# Patient Record
Sex: MALE | Race: WHITE | Employment: FULL TIME | URBAN - METROPOLITAN AREA
[De-identification: names, ages, dates, MRNs, and addresses within clinical notes are randomized per-mention and may not be internally consistent; named-entity substitution may affect disease eponyms.]

---

## 2017-06-14 DIAGNOSIS — E10.8 TYPE 1 DIABETES MELLITUS WITH COMPLICATIONS (H): ICD-10-CM

## 2017-06-28 ENCOUNTER — MEDICAL CORRESPONDENCE (OUTPATIENT)
Dept: HEALTH INFORMATION MANAGEMENT | Facility: CLINIC | Age: 47
End: 2017-06-28

## 2017-11-21 ENCOUNTER — OFFICE VISIT (OUTPATIENT)
Dept: ENDOCRINOLOGY | Facility: CLINIC | Age: 47
End: 2017-11-21

## 2017-11-21 VITALS
HEIGHT: 70 IN | HEART RATE: 82 BPM | DIASTOLIC BLOOD PRESSURE: 82 MMHG | SYSTOLIC BLOOD PRESSURE: 147 MMHG | BODY MASS INDEX: 23.19 KG/M2 | WEIGHT: 162 LBS

## 2017-11-21 DIAGNOSIS — E78.5 DYSLIPIDEMIA: ICD-10-CM

## 2017-11-21 DIAGNOSIS — E06.3 CHRONIC LYMPHOCYTIC THYROIDITIS: ICD-10-CM

## 2017-11-21 DIAGNOSIS — I25.10 CORONARY ARTERY DISEASE DUE TO CALCIFIED CORONARY LESION: ICD-10-CM

## 2017-11-21 DIAGNOSIS — I25.84 CORONARY ARTERY DISEASE DUE TO CALCIFIED CORONARY LESION: ICD-10-CM

## 2017-11-21 DIAGNOSIS — E10.9 TYPE 1 DIABETES MELLITUS WITHOUT COMPLICATION (H): Primary | ICD-10-CM

## 2017-11-21 DIAGNOSIS — E10.9 TYPE 1 DIABETES MELLITUS WITHOUT COMPLICATION (H): ICD-10-CM

## 2017-11-21 LAB
ANION GAP SERPL CALCULATED.3IONS-SCNC: 4 MMOL/L (ref 3–14)
BUN SERPL-MCNC: 13 MG/DL (ref 7–30)
CHLORIDE SERPL-SCNC: 104 MMOL/L (ref 94–109)
CO2 SERPL-SCNC: 32 MMOL/L (ref 20–32)
CREAT SERPL-MCNC: 0.96 MG/DL (ref 0.66–1.25)
CREAT UR-MCNC: 79 MG/DL
GFR SERPL CREATININE-BSD FRML MDRD: 84 ML/MIN/1.7M2
HBA1C MFR BLD: 7.3 % (ref 4.3–6)
MICROALBUMIN UR-MCNC: <5 MG/L
MICROALBUMIN/CREAT UR: NORMAL MG/G CR (ref 0–17)
POTASSIUM SERPL-SCNC: 4.5 MMOL/L (ref 3.4–5.3)
SODIUM SERPL-SCNC: 140 MMOL/L (ref 133–144)
T4 FREE SERPL-MCNC: 1.29 NG/DL (ref 0.76–1.46)
TSH SERPL DL<=0.005 MIU/L-ACNC: 0.59 MU/L (ref 0.4–4)

## 2017-11-21 RX ORDER — ROSUVASTATIN CALCIUM 10 MG/1
10 TABLET, COATED ORAL DAILY
Qty: 90 TABLET | Refills: 3 | Status: SHIPPED | OUTPATIENT
Start: 2017-11-21 | End: 2018-10-16

## 2017-11-21 ASSESSMENT — PAIN SCALES - GENERAL: PAINLEVEL: NO PAIN (0)

## 2017-11-21 NOTE — PROGRESS NOTES
Endocrinology and Diabetes Clinic Follow-up   Date of Service: November 21, 2017    Reason for follow-up: Type I DM    Assessment:    1.  Type 1 diabetes mellitus which remains in good control, A1c 7.3%.   2.  Mild to Moderate Coronary Artery Disease on Crestor 10 mg and baby aspirin daily.  3.  Chronic lymphocytic thyroiditis which has been adequately replaced.   4.  Dyslipidemia which is treated with Crestor 10 mg daily - did not tolerate higher dose.  5.  Adhesive capsulitis of both shoulders - ongoing issue.    Plan:   1) Increase bolus dosing to 1 U : 12 grams carbs with lunch and dinner  2) Continue 670G automode basal  3) Will touch base with CDE about why he may getting kicked out of auto mode so frequently  4) Check TSH, free T4, BMP, urine microalbumin  5) Opthalmology referral  6) Consider referral to lipid clinic to discuss additional CAD primary prevention strategies   7) RTC in 6 months    Patient was seen and examined with attending physician, Dr. Adler.    Subjective:   Schuyler Gallardo is a 47 year old male  with a past medical history of Type I DM and dyslipidemia who presents for follow-up of diabetes. He has had Type I DM for 44 years. He was last seen in clinic on 12/2016 by Dr. Apple. His A1c at that time was 7%. He is now on a Medtronicb 670G with continous glucose monitoring as of July 2017. He really likes it but is bothered that he is frequently kicked out of auto mode during the day. He is not sure why. He is in auto mode 57% of the time. His basal bolus ratio is 47%/53%, or 15 units and 17 units respectively. His data trends indicate that he is pretty stable overnight but that he tends to run a bit high after lunch and dinner. He has been on 1:15 carb ratio for years but is willing to make an adjustment. He checks his blood sugars 3 times daily.    Interval history notable for a calcium score in the top 1%. Had a CT angiogram with mild to moderate coronary disease. He is on Crestor 10 mg  daily and has not been able to tolerate a higher dose. He has considered a fibrate or addition of a PCSK9 inhibitor but currently the indications for the latter drug are quite limited (not for primary prevention in non-familial disease). He has no family history of early coronary disease. He does not smoke and only occasionally drinks alcohol. He is on a baby aspirin.    No previous issues with kidneys, neuropathy, or retinopathy. No skin changes or open sores on his feet. He is overdue for an eye appointment.      Denies symptoms of temperature intolerance, weight changes, diarrhea, constipation, palpitations, excessive sweating, or tremors.      Current Medications:  1. Humalog insulin in a Medtronic 670G insulin pump.  2. Crestor 10 mg daily (muscle fatigue at 20 mg).   3. Levothyroxine 112 mcg daily.   4. Multivitamin daily.   5. Baby aspirin      Current Problem List:   Patient Active Problem List   Diagnosis     Dyslipidemia     Type 1 diabetes mellitus (H)     Medications:   Current Outpatient Prescriptions   Medication Sig Dispense Refill     rosuvastatin (CRESTOR) 10 MG tablet Take 1 tablet (10 mg) by mouth daily 90 tablet 3     VIANNEY CONTOUR NEXT test strip Test 4-5 times daily / OR AS DIRECTED 450 each 3     insulin lispro (HUMALOG VIAL) 100 UNIT/ML VIAL Use in pump basal rate plus meal coverage approx 50-60 units daily 60 mL 3     levothyroxine (LEVOTHROID) 112 MCG tablet Take 1 tablet (112 mcg) by mouth daily 90 tablet 3     Multiple Vitamin (MULTIVITAMINS PO)        Insulin Syringes, Disposable, U-100 0.5 ML MISC        [DISCONTINUED] rosuvastatin (CRESTOR) 20 MG tablet Take 1 tablet (20 mg) by mouth daily 90 tablet 3     Allergies:   No Known Allergies    Social History:  Social History   Substance Use Topics     Smoking status: Never Smoker     Smokeless tobacco: Never Used     Alcohol use Not on file     Bull is  and lives with his wife and 3 children in [place], [place].  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     Family History:  Father had prostate cancer in his 60's     Review of Systems:  CONSTITUTIONAL:  Negative.    HEENT:  He is due for an eye examination and will schedule one.   RESPIRATORY:  Negative.   CARDIOVASCULAR:  Negative.   GASTROINTESTINAL:  Negative.   GENITOURINARY:  Negative.   ENDOCRINE:  See HPI.   INTEGUMENT:  Negative.   MUSCULOSKELETAL:  His shoulders have responded to range of motion exercises but are not yet completely back to normal.   NEUROLOGIC:  Negative.   PSYCHIATRIC:  Negative.     Physical Examination:  Blood pressure 147/82, pulse 82, height 1.778 m (5' 10\"), weight 73.5 kg (162 lb).  Body mass index is 23.24 kg/(m^2).  Wt Readings from Last 4 Encounters:   11/21/17 73.5 kg (162 lb)   08/02/16 80.7 kg (178 lb)   08/20/14 75.8 kg (167 lb)   08/09/13 82.2 kg (181 lb 4.8 oz)     GENERAL:  A healthy-appearing man.   EYES:  Extraocular movements are intact.  Sclerae are clear.  No retinopathy appreciated.   NECK:  No goiter, bruit or adenopathy.   CHEST:  Clear to auscultation.   CARDIOVASCULAR:  Regular rate and rhythm.  No murmur.   ABDOMEN:  Soft. No hepatomegaly. No lipodystrophy noted.  EXTREMITIES: No pretibial edema.  Feet in good repair.  Light touch intact in both feet.     Labs and Studies:   Component      Latest Ref Rng & Units 12/21/2016   Hemoglobin A1C      4.3 - 6.0 % 7.0 (H)   TSH      0.40 - 4.00 mU/L 1.97   T4 Free      0.76 - 1.46 ng/dL 1.31   CK Total      30 - 300 U/L 94   ALT      0 - 70 U/L 21   LDL Cholesterol Direct      <100 mg/dL 90     Stewart Dobbs MD (PGY-4)  Diabetes and Endocrinology Fellow  [facility]  Pager: [phone]     I have seen and examined the patient, reviewed and edited the fellow's note, and agree with the plan of care.  CHARELS Payne    "

## 2017-11-21 NOTE — MR AVS SNAPSHOT
After Visit Summary   11/21/2017    Schuyler Gallardo    MRN: [id number]           Patient Information     Date Of Birth          1970        Visit Information        Provider Department      11/21/2017 2:00 PM Boyd Adler MD [facility] Endocrinology        Today's Diagnoses     Type 1 diabetes mellitus without complication (H)    -  1    Dyslipidemia        Chronic lymphocytic thyroiditis        Coronary artery disease due to calcified coronary lesion          Care Instructions    1. Please have labs completed today.    2. Make those carb ration changes as we discussed - 1 units per 12 grams carbs with lunch and dinner.    3. We will discuss with our diabetes educators about how you've been getting kicked out of auto mode - perhaps there is a solution?    4. Return to clinic in 6 months.    5. We sent in a new prescription for 10 mg Crestor daily    6. Let us know if you would like to consider a fibrate or other treatment modality.                Follow-ups after your visit        Additional Services     OPHTHALMOLOGY ADULT REFERRAL       Your provider has referred you to: [facility]: Eye Clinic [facility] [phone]   [email or web address]    Please be aware that coverage of these services is subject to the terms and limitations of your health insurance plan.  Call member services at your health plan with any benefit or coverage questions.      Please bring the following with you to your appointment:    (1) Any X-Rays, CTs or MRIs which have been performed.  Contact the facility where they were done to arrange for  prior to your scheduled appointment.    (2) List of current medications  (3) This referral request   (4) Any documents/labs given to you for this referral                  Follow-up notes from your care team     Return in about 6 months (around 5/21/2018).      Who to contact     Please call your clinic at [phone] to:    Ask questions about your  "health    Make or cancel appointments    Discuss your medicines    Learn about your test results    Speak to your doctor   If you have compliments or concerns about an experience at your clinic, or if you wish to file a complaint, please contact [facility] Patient Relations at [phone] or email us at [email or web address]         Additional Information About Your Visit        Medicagohart Information     Instamediat is an electronic gateway that provides easy, online access to your medical records. With Experenti, you can request a clinic appointment, read your test results, renew a prescription or communicate with your care team.     To sign up for Experenti visit the website at [email or web address]   You will be asked to enter the access code listed below, as well as some personal information. Please follow the directions to create your username and password.     Your access code is: [email or web address]  Expires: 2018  6:30 AM     Your access code will  in 90 days. If you need help or a new code, please contact your [facility] Clinic or call [phone] for assistance.        Care EveryWhere ID     This is your Care EveryWhere ID. This could be used by other organizations to access your [facility] medical records  [id number]        Your Vitals Were     Pulse Height BMI (Body Mass Index)             82 1.778 m (5' 10\") 23.24 kg/m2          Blood Pressure from Last 3 Encounters:   17 147/82   16 129/80   16 134/82    Weight from Last 3 Encounters:   17 73.5 kg (162 lb)   16 80.7 kg (178 lb)   14 75.8 kg (167 lb)              We Performed the Following     Albumin Random Urine Quantitative with Creat Ratio     Hemoglobin A1c POCT     OPHTHALMOLOGY ADULT REFERRAL          Today's Medication Changes          These changes are accurate as of: 17 11:59 PM.  If you have any questions, ask your nurse or doctor.          "      Start taking these medicines.        Dose/Directions    aspirin 81 MG tablet   Used for:  Type 1 diabetes mellitus without complication (H), Coronary artery disease due to calcified coronary lesion   Started by:  Boyd Adler MD        Dose:  81 mg   Take 1 tablet (81 mg) by mouth daily   Quantity:  30 tablet   Refills:  0         These medicines have changed or have updated prescriptions.        Dose/Directions    rosuvastatin 10 MG tablet   Commonly known as:  CRESTOR   This may have changed:    - medication strength  - how much to take   Used for:  Dyslipidemia   Changed by:  Boyd Adler MD        Dose:  10 mg   Take 1 tablet (10 mg) by mouth daily   Quantity:  90 tablet   Refills:  3            Where to get your medicines      [facility] [place] [place] - [address]   [address] [facility] , [facility] [address]    Hours:  24-hours Phone:  [phone]     rosuvastatin 10 MG tablet         Some of these will need a paper prescription and others can be bought over the counter.  Ask your nurse if you have questions.     You don't need a prescription for these medications     aspirin 81 MG tablet                Primary Care Provider    None Specified       No primary provider on file.        Equal Access to Services     [facility] : Hadfranco Terrazas, kaz lindsay, mamadou thompson, alcira campos . So [facility] [phone].    ATENCIÓN: Si habla español, tiene a dejesus disposición servicios gratuitos de asistencia lingüística. Llame al [phone].    We comply with applicable federal civil rights laws and [place] laws. We do not discriminate on the basis of race, color, national origin, age, disability, sex, sexual orientation, or gender identity.            Thank you!     Thank you for choosing [facility]  for your care. Our goal is always to provide you with excellent care.  Hearing back from our patients is one way we can continue to improve our services. Please take a few minutes to complete the written survey that you may receive in the mail after your visit with us. Thank you!             Your Updated Medication List - Protect others around you: Learn how to safely use, store and throw away your medicines at www.disposemymeds.org.          This list is accurate as of: 11/21/17 11:59 PM.  Always use your most recent med list.                   Brand Name Dispense Instructions for use Diagnosis    aspirin 81 MG tablet     30 tablet    Take 1 tablet (81 mg) by mouth daily    Type 1 diabetes mellitus without complication (H), Coronary artery disease due to calcified coronary lesion       VIANNEY CONTOUR NEXT test strip   Generic drug:  blood glucose monitoring     450 each    Test 4-5 times daily / OR AS DIRECTED    Type 1 diabetes mellitus with complications (H)       insulin lispro 100 UNIT/ML injection    humaLOG    60 mL    Use in pump basal rate plus meal coverage approx 50-60 units daily    Diabetes mellitus type 1 (H)       insulin syringes (disposable) U-100 0.5 ML Misc           levothyroxine 112 MCG tablet    LEVOTHROID    90 tablet    Take 1 tablet (112 mcg) by mouth daily    Type 1 diabetes mellitus (H)       MULTIVITAMINS PO           rosuvastatin 10 MG tablet    CRESTOR    90 tablet    Take 1 tablet (10 mg) by mouth daily    Dyslipidemia

## 2017-11-21 NOTE — PATIENT INSTRUCTIONS
1. Please have labs completed today.    2. Make those carb ration changes as we discussed - 1 units per 12 grams carbs with lunch and dinner.    3. We will discuss with our diabetes educators about how you've been getting kicked out of auto mode - perhaps there is a solution?    4. Return to clinic in 6 months.    5. We sent in a new prescription for 10 mg Crestor daily    6. Let us know if you would like to consider a fibrate or other treatment modality.

## 2017-11-21 NOTE — LETTER
Date:November 28, 2017      Patient was self referred, no letter generated. Do not send.        [facility] Health Information

## 2017-11-21 NOTE — LETTER
11/21/2017       RE: Schuyler Gallardo  [address] [address]     Dear Colleague,    Thank you for referring your patient, Schuyler Gallardo, to the [facility] ENDOCRINOLOGY at [facility]. Please see a copy of my visit note below.    Endocrinology and Diabetes Clinic Follow-up   Date of Service: November 21, 2017    Reason for follow-up: Type I DM    Assessment:    1.  Type 1 diabetes mellitus which remains in good control, A1c 7.3%.   2.  Mild to Moderate Coronary Artery Disease on Crestor 10 mg and baby aspirin daily.  3.  Chronic lymphocytic thyroiditis which has been adequately replaced.   4.  Dyslipidemia which is treated with Crestor 10 mg daily - did not tolerate higher dose.  5.  Adhesive capsulitis of both shoulders - ongoing issue.    Plan:   1) Increase bolus dosing to 1 U : 12 grams carbs with lunch and dinner  2) Continue 670G automode basal  3) Will touch base with CDE about why he may getting kicked out of auto mode so frequently  4) Check TSH, free T4, BMP, urine microalbumin  5) Opthalmology referral  6) Consider referral to lipid clinic to discuss additional CAD primary prevention strategies   7) RTC in 6 months    Patient was seen and examined with attending physician, Dr. Adler.    Subjective:   Schuyler Gallardo is a 47 year old male  with a past medical history of Type I DM and dyslipidemia who presents for follow-up of diabetes. He has had Type I DM for 44 years. He was last seen in clinic on 12/2016 by Dr. Apple. His A1c at that time was 7%. He is now on a Medtronicb 670G with continous glucose monitoring as of July 2017. He really likes it but is bothered that he is frequently kicked out of auto mode during the day. He is not sure why. He is in auto mode 57% of the time. His basal bolus ratio is 47%/53%, or 15 units and 17 units respectively. His data trends indicate that he is pretty stable overnight but that he tends to run a bit high after lunch  and dinner. He has been on 1:15 carb ratio for years but is willing to make an adjustment. He checks his blood sugars 3 times daily.    Interval history notable for a calcium score in the top 1%. Had a CT angiogram with mild to moderate coronary disease. He is on Crestor 10 mg daily and has not been able to tolerate a higher dose. He has considered a fibrate or addition of a PCSK9 inhibitor but currently the indications for the latter drug are quite limited (not for primary prevention in non-familial disease). He has no family history of early coronary disease. He does not smoke and only occasionally drinks alcohol. He is on a baby aspirin.    No previous issues with kidneys, neuropathy, or retinopathy. No skin changes or open sores on his feet. He is overdue for an eye appointment.      Denies symptoms of temperature intolerance, weight changes, diarrhea, constipation, palpitations, excessive sweating, or tremors.      Current Medications:  1. Humalog insulin in a Medtronic 670G insulin pump.  2. Crestor 10 mg daily (muscle fatigue at 20 mg).   3. Levothyroxine 112 mcg daily.   4. Multivitamin daily.   5. Baby aspirin      Current Problem List:   Patient Active Problem List   Diagnosis     Dyslipidemia     Type 1 diabetes mellitus (H)     Medications:   Current Outpatient Prescriptions   Medication Sig Dispense Refill     rosuvastatin (CRESTOR) 10 MG tablet Take 1 tablet (10 mg) by mouth daily 90 tablet 3     VIANNEY CONTOUR NEXT test strip Test 4-5 times daily / OR AS DIRECTED 450 each 3     insulin lispro (HUMALOG VIAL) 100 UNIT/ML VIAL Use in pump basal rate plus meal coverage approx 50-60 units daily 60 mL 3     levothyroxine (LEVOTHROID) 112 MCG tablet Take 1 tablet (112 mcg) by mouth daily 90 tablet 3     Multiple Vitamin (MULTIVITAMINS PO)        Insulin Syringes, Disposable, U-100 0.5 ML MISC        [DISCONTINUED] rosuvastatin (CRESTOR) 20 MG tablet Take 1 tablet (20 mg) by mouth daily 90 tablet 3  "    Allergies:   No Known Allergies    Social History:  Social History   Substance Use Topics     Smoking status: Never Smoker     Smokeless tobacco: Never Used     Alcohol use Not on file     Bull is  and lives with his wife and 3 children in [place], [place].  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     Family History:  Father had prostate cancer in his 60's     Review of Systems:  CONSTITUTIONAL:  Negative.    HEENT:  He is due for an eye examination and will schedule one.   RESPIRATORY:  Negative.   CARDIOVASCULAR:  Negative.   GASTROINTESTINAL:  Negative.   GENITOURINARY:  Negative.   ENDOCRINE:  See HPI.   INTEGUMENT:  Negative.   MUSCULOSKELETAL:  His shoulders have responded to range of motion exercises but are not yet completely back to normal.   NEUROLOGIC:  Negative.   PSYCHIATRIC:  Negative.      Physical Examination:  Blood pressure 147/82, pulse 82, height 1.778 m (5' 10\"), weight 73.5 kg (162 lb).  Body mass index is 23.24 kg/(m^2).  Wt Readings from Last 4 Encounters:   11/21/17 73.5 kg (162 lb)   08/02/16 80.7 kg (178 lb)   08/20/14 75.8 kg (167 lb)   08/09/13 82.2 kg (181 lb 4.8 oz)     GENERAL:  A healthy-appearing man.   EYES:  Extraocular movements are intact.  Sclerae are clear.  No retinopathy appreciated.   NECK:  No goiter, bruit or adenopathy.   CHEST:  Clear to auscultation.   CARDIOVASCULAR:  Regular rate and rhythm.  No murmur.   ABDOMEN:  Soft. No hepatomegaly. No lipodystrophy noted.  EXTREMITIES: No pretibial edema.  Feet in good repair.  Light touch intact in both feet.     Labs and Studies:   Component      Latest Ref Rng & Units 12/21/2016   Hemoglobin A1C      4.3 - 6.0 % 7.0 (H)   TSH      0.40 - 4.00 mU/L 1.97   T4 Free      0.76 - 1.46 ng/dL 1.31   CK Total      30 - 300 U/L 94   ALT      0 - 70 U/L 21   LDL Cholesterol Direct      <100 mg/dL 90     Stewart Dobbs MD (PGY-4)  Diabetes and Endocrinology " Fellow  [facility]  Pager: [phone]     I have seen and examined the patient, reviewed and edited the fellow's note, and agree with the plan of care.  CHARLES Payne      Again, thank you for allowing me to participate in the care of your patient.      Sincerely,    Boyd Adler MD

## 2017-11-21 NOTE — NURSING NOTE
Chief Complaint   Patient presents with     RECHECK     F/U TYPE I DM     Claudia Garcia, CMA  Endocrinology & Diabetes 3G    Capillary Fingerstick performed for an Hemoglobin A1C test

## 2018-05-21 ENCOUNTER — OFFICE VISIT (OUTPATIENT)
Dept: OPHTHALMOLOGY | Facility: CLINIC | Age: 48
End: 2018-05-21
Attending: OPHTHALMOLOGY
Payer: COMMERCIAL

## 2018-05-21 DIAGNOSIS — H52.13 MYOPIC ASTIGMATISM OF BOTH EYES: ICD-10-CM

## 2018-05-21 DIAGNOSIS — H52.203 MYOPIC ASTIGMATISM OF BOTH EYES: ICD-10-CM

## 2018-05-21 DIAGNOSIS — H52.4 PRESBYOPIA: ICD-10-CM

## 2018-05-21 DIAGNOSIS — E10.9 TYPE 1 DIABETES MELLITUS WITHOUT RETINOPATHY (H): Primary | ICD-10-CM

## 2018-05-21 PROCEDURE — G0463 HOSPITAL OUTPT CLINIC VISIT: HCPCS | Mod: ZF

## 2018-05-21 PROCEDURE — 92015 DETERMINE REFRACTIVE STATE: CPT | Mod: ZF

## 2018-05-21 ASSESSMENT — REFRACTION_MANIFEST
OS_ADD: +1.50
OS_AXIS: 180
OD_CYLINDER: +0.75
OD_AXIS: 002
OD_SPHERE: -0.75
OS_CYLINDER: +2.50
OS_SPHERE: -2.00
OD_ADD: +1.50

## 2018-05-21 ASSESSMENT — CUP TO DISC RATIO
OS_RATIO: 0.4
OD_RATIO: 0.4

## 2018-05-21 ASSESSMENT — VISUAL ACUITY
OS_SC+: -1
OS_PH_SC: 20/25-1
OD_SC+: -2
OS_SC: 20/50
OD_SC: 20/15
METHOD: SNELLEN - LINEAR

## 2018-05-21 ASSESSMENT — SLIT LAMP EXAM - LIDS
COMMENTS: NORMAL
COMMENTS: NORMAL

## 2018-05-21 ASSESSMENT — TONOMETRY
OD_IOP_MMHG: 18
OS_IOP_MMHG: 19
IOP_METHOD: TONOPEN

## 2018-05-21 ASSESSMENT — EXTERNAL EXAM - LEFT EYE: OS_EXAM: NORMAL

## 2018-05-21 ASSESSMENT — EXTERNAL EXAM - RIGHT EYE: OD_EXAM: NORMAL

## 2018-05-21 ASSESSMENT — CONF VISUAL FIELD
OD_NORMAL: 1
OS_NORMAL: 1

## 2018-05-21 NOTE — NURSING NOTE
Chief Complaints and History of Present Illnesses   Patient presents with     New Patient     type I diabetic eye exam     HPI    Affected eye(s):  Both   Symptoms:     No decreased vision   No floaters   No flashes   No foreign body sensation   No Dryness      Frequency:  Constant       Do you have eye pain now?:  No      Comments:  Pt here for routine diabetic eye exam  Pt reports he does have glasses for distance vision (didn't bring with today)  Pt reports no recent changes in vision  Last eye exam was 3 or 4 years ago    No drops    Stefanie PEDROZA 8:43 AM May 21, 2018

## 2018-05-21 NOTE — PROGRESS NOTES
HPI  Schuyler Gallardo is a 47 year old male here for diabetic eye exam. He feels his vision is overall good and stable in both eyes. He has glasses which he wears sometimes for driving. No pain, redness, discharge. No flashes/floaters.    POH: No history of eye surgery or trauma. No prior history of DR.  PMH: Type 1 diabetes diagnosed at age 3.    Assessment & Plan      (E10.9) Type 1 diabetes mellitus without retinopathy (H)  (primary encounter diagnosis)  Comment: Last A1c 6.8 per patient. No diabetic retinopathy.  Plan: Discussed the importance of tight blood glucose control in the prevention of diabetic retinopathy. Recommend yearly dilated eye exam.    (H52.203,  H52.13) Myopic astigmatism of both eyes  (H52.4) Presbyopia  Comment: Good vision with refraction  Plan: Given updated glasses Rx.      -----------------------------------------------------------------------------------    Patient disposition:   Return in about 1 year (around 5/21/2019). or sooner as needed.    Teaching statement:  Complete documentation of historical and exam elements from today's encounter can be found in the full encounter summary report (not reduplicated in this progress note). I personally obtained the chief complaint(s) and history of present illness.  I confirmed and edited as necessary the review of systems, past medical/surgical history, family history, social history, and examination findings as documented by others; and I examined the patient myself. I personally reviewed the relevant tests, images, and reports as documented above.     I formulated and edited as necessary the assessment and plan and discussed the findings and management plan with the patient and family.    Stefanie East MD  Comprehensive Ophthalmology & Ocular Pathology  Department of Ophthalmology and Visual Neurosciences  [email or web address]  Pager [phone]

## 2018-05-23 ENCOUNTER — TELEPHONE (OUTPATIENT)
Dept: ENDOCRINOLOGY | Facility: CLINIC | Age: 48
End: 2018-05-23

## 2018-05-23 NOTE — TELEPHONE ENCOUNTER
[facility] Call Center    Phone Message    May a detailed message be left on voicemail: yes    Reason for Call: Other: Patient has a couple questions and would like someone to give him a call back.     Action Taken: Message routed to:  [facility] ([facility] Oscar

## 2018-05-30 ENCOUNTER — TELEPHONE (OUTPATIENT)
Dept: ENDOCRINOLOGY | Facility: CLINIC | Age: 48
End: 2018-05-30

## 2018-08-23 ENCOUNTER — TELEPHONE (OUTPATIENT)
Dept: ENDOCRINOLOGY | Facility: CLINIC | Age: 48
End: 2018-08-23

## 2018-08-23 NOTE — TELEPHONE ENCOUNTER
[facility] Call [facility]    Phone Message    May a detailed message be left on voicemail: yes    Reason for Call: Other: [employer] is calling about a form they faxed over on 8/17/18 for diabetic supplies & pump supplies. Please contact them at [phone], option 2, anyone can help.      Action Taken: Message routed to:  [facility] ([facility]): Endocrinology

## 2018-09-11 ENCOUNTER — MEDICAL CORRESPONDENCE (OUTPATIENT)
Dept: HEALTH INFORMATION MANAGEMENT | Facility: CLINIC | Age: 48
End: 2018-09-11

## 2018-09-20 ENCOUNTER — TELEPHONE (OUTPATIENT)
Dept: ENDOCRINOLOGY | Facility: CLINIC | Age: 48
End: 2018-09-20

## 2018-09-20 NOTE — TELEPHONE ENCOUNTER
[facility] Call Center    Phone Message    May a detailed message be left on voicemail: yes    Reason for Call: Other: Pt wondering if could be fit in the week of 10/15 with Dr. Adler. Pt will be on vacation that week and hoping if could be fit in schedule on 10/16.      Action Taken: Message routed to:  [facility] ([facility]): Endocrinology and Diabetes Clinic

## 2018-10-16 ENCOUNTER — OFFICE VISIT (OUTPATIENT)
Dept: ENDOCRINOLOGY | Facility: CLINIC | Age: 48
End: 2018-10-16
Payer: COMMERCIAL

## 2018-10-16 VITALS
BODY MASS INDEX: 23.82 KG/M2 | WEIGHT: 166.4 LBS | HEIGHT: 70 IN | DIASTOLIC BLOOD PRESSURE: 82 MMHG | HEART RATE: 82 BPM | SYSTOLIC BLOOD PRESSURE: 133 MMHG

## 2018-10-16 DIAGNOSIS — E78.5 DYSLIPIDEMIA: ICD-10-CM

## 2018-10-16 DIAGNOSIS — E55.9 VITAMIN D DEFICIENCY: ICD-10-CM

## 2018-10-16 DIAGNOSIS — E10.65 TYPE 1 DIABETES MELLITUS WITH HYPERGLYCEMIA (H): ICD-10-CM

## 2018-10-16 DIAGNOSIS — E03.9 HYPOTHYROIDISM, UNSPECIFIED TYPE: Primary | ICD-10-CM

## 2018-10-16 RX ORDER — ROSUVASTATIN CALCIUM 10 MG/1
TABLET, COATED ORAL
Qty: 135 TABLET | Refills: 3 | Status: SHIPPED | OUTPATIENT
Start: 2018-10-16 | End: 2019-10-15

## 2018-10-16 RX ORDER — LEVOTHYROXINE SODIUM 112 UG/1
112 TABLET ORAL DAILY
Qty: 90 TABLET | Refills: 3 | Status: SHIPPED | OUTPATIENT
Start: 2018-10-16 | End: 2019-11-21

## 2018-10-16 ASSESSMENT — PAIN SCALES - GENERAL: PAINLEVEL: NO PAIN (0)

## 2018-10-16 NOTE — LETTER
10/16/2018     RE: Schuyler Gallardo  [address] [facility] [address]     Dear Colleague,    Thank you for referring your patient, Schuyler Gallardo, to the [facility] ENDOCRINOLOGY at [facility]. Please see a copy of my visit note below.    [facility]  Endocrinology  Boyd Adler MD  10/16/2018      Chief Complaint:   Diabetes    History of Present Illness:   Schuyler Gallardo is a 48 year old male with a history of type 1 diabetes mellitus and chronic lymphocytic thyroiditis here to follow up. Today he has no complaints, he denies having any acute illness since his last visit.    He use Medtronic 670G insulin pump, which was downloaded and reviewed.  Pump was in auto mode around 53% of the time. He thinks his A1c has been higher since starting 670 G but has helped avoid low's. He thinks that specifically needs more insulin overnight. . He feels that the sensor is accurate on days 1-4, but it starts to get worse and fails by day 5, and he rarely gets to use it on day 6/7. He runs out of sensors Friday of every week due to the sensor failure. Getting replacements takes a 45 minute phone call and is difficult. He usually runs on the treadmill in the evening between 7 and 9pm. He eats dinner before exercise. Sometimes he will have a snack before bed. When he eats before bed he his blood sugar often spikes. When he notices a high he will override it manually because the auto mode takes too long to adjust. This is usually after eating and he miscounts his carbohydrates. He thinks that if he stops using auto mode at bedtime he'll eliminate highs but may have more lows.     Denies numbness and tingling. He reports tolerating exercise well.     He is unable to go above 10mg Crestor because it makes his muscles hurt while running. In the past adding vitamin D was helpful. He would consider alternating between 10 and 20mg doses. He takes a vitamin D supplement.     He denies symptoms of  hypothyroidism including fatigue and weight gain. His sleep is good.     Blood Glucose Monitoring:  We reviewed glucometer and CGM data together.    Average blood glucose of 151 with standard deviation of 52. Highest standard deviation is overnight and the morning. His sensor is worn an average of 4 days per week. 73% in range, 21% between 180 and 250, 4% above 250, and 2% between 50 and 70.     Current Insulin Pump Settings:  Type of Pump: Medtronic Minimed: Model 670G  Current Settings:   BASAL RATES and times:  12   AM (midnight): 0.75 units/hour    1     AM: 0.7 units/hour   6     AM: 0.55 units/hour   10:30  AM: 0.45 units/hour   11:30   PM: 0.65 units/hour    CARB RATIO and times:  12   AM (midnight): 12   Corection Factor (Sensitivity) and times:  12   AM (midnight): 40 mg/dL   BLOOD GLUCOSE TARGET and times:  12   AM (midnight): 90 - 125   Total daily insulin: 32 unit(s), bolus 59% and basal 41%.     Diabetes monitoring and complications:  CAD: Yes: mild to moderate  Last eye exam results: : 05/21/2018   Dental exams: regularly  Microalbuminuria: undetected 11/21/2017  HTN: No  On Statin: Yes  On Aspirin: Yes  Depression: No  Erectile dysfunction: No    Review of Systems:   Pertinent items are noted in HPI.  All other systems are negative.    Active Medications:     Current Outpatient Prescriptions:      blood glucose monitoring ([employer] CONTOUR NEXT) test strip, Test 4-5 times daily / OR AS DIRECTED, Disp: 450 each, Rfl: 3     insulin lispro (HUMALOG) 100 UNIT/ML injection, Use in pump basal rate plus meal coverage approx 50-60 units daily, Disp: 60 mL, Rfl: 3     levothyroxine (LEVOTHROID) 112 MCG tablet, Take 1 tablet (112 mcg) by mouth daily, Disp: 90 tablet, Rfl: 3     rosuvastatin (CRESTOR) 10 MG tablet, TAKE 10 MG ALTERNATING WITH 20 MG DAILY., Disp: 135 tablet, Rfl: 3     aspirin 81 MG tablet, Take 1 tablet (81 mg) by mouth daily, Disp: 30 tablet, Rfl:      Insulin Syringes, Disposable, U-100 0.5 ML  "MISC, , Disp: , Rfl:      Multiple Vitamin (MULTIVITAMINS PO), , Disp: , Rfl:      [DISCONTINUED] insulin lispro (HUMALOG VIAL) 100 UNIT/ML VIAL, Use in pump basal rate plus meal coverage approx 50-60 units daily, Disp: 60 mL, Rfl: 3     [DISCONTINUED] levothyroxine (LEVOTHROID) 112 MCG tablet, Take 1 tablet (112 mcg) by mouth daily, Disp: 90 tablet, Rfl: 3     [DISCONTINUED] rosuvastatin (CRESTOR) 10 MG tablet, Take 1 tablet (10 mg) by mouth daily, Disp: 90 tablet, Rfl: 3      Allergies:   Review of patient's allergies indicates no known allergies.      Past Medical History:  Type 1 diabetes mellitus  Dyslipidemia   Lymphocytic thyroiditis  Coronary artery disease     Past Surgical History:  No past surgical history on file.    Family History:   Paternal grandfather: macular degeneration      Social History:      Non smoker  Cardiologist, doing a talk on atrial fibrillation     Physical Exam:   /82  Pulse 82  Ht 1.778 m (5' 10\")  Wt 75.5 kg (166 lb 6.4 oz)  BMI 23.88 kg/m2     Wt Readings from Last 10 Encounters:   10/16/18 75.5 kg (166 lb 6.4 oz)   11/21/17 73.5 kg (162 lb)   08/02/16 80.7 kg (178 lb)   08/20/14 75.8 kg (167 lb)   08/09/13 82.2 kg (181 lb 4.8 oz)   07/31/12 79 kg (174 lb 3.2 oz)   10/19/11 76.2 kg (168 lb)      Constitutional: no distress, comfortable, pleasant   Eyes: anicteric, normal extra-ocular movements, No lig lag, retraction or proptosis  Ears, Nose and Throat: throat clear  Neck: supple, no thyromegaly.   Cardiovascular: regular rate and rhythm, normal S1 and S2, no murmurs  Respiratory: clear to auscultation, no wheezes or crackles, normal breath sounds   Gastrointestinal: nontender, no striae   Musculoskeletal: no edema   Skin:  no jaundice   Neurological: cranial nerves intact, normal strength, reflexes at patella and biceps normal, no tremor   Psychological: appropriate mood   Lymphatic: no cervical lymphadenopathy      Data:  Lab Results   Component Value Date    NA " 140 11/21/2017    POTASSIUM 4.5 11/21/2017    CHLORIDE 104 11/21/2017    CO2 32 11/21/2017    ANIONGAP 4 11/21/2017     (H) 08/01/2016    BUN 13 11/21/2017    CR 0.96 11/21/2017     Lab Results   Component Value Date    GFRESTIMATED 84 11/21/2017    GFRESTIMATED 78 08/01/2016    GFRESTIMATED 81 08/20/2014    GFRESTBLACK >90 11/21/2017    GFRESTBLACK >90   GFR Calc   08/01/2016    GFRESTBLACK >90   GFR Calc   08/20/2014      Lab Results   Component Value Date    MICROL <5 11/21/2017    UMALCR Unable to calculate due to low value 11/21/2017        Lab Results   Component Value Date    A1C 7.0 (H) 12/21/2016    A1C 6.6 (H) 08/01/2016    A1C 6.7 (H) 08/20/2014    A1C 6.5 (H) 08/08/2013    A1C 6.5 (H) 10/19/2011    HEMOGLOBINA1 7.3 (A) 11/21/2017    HEMOGLOBINA1 6.7 (H) 10/21/2009    HEMOGLOBINA1 6.6 (H) 09/18/2008     Lab Results   Component Value Date    CHOL 151 08/01/2016    CHOL 133 08/20/2014    TRIG 50 08/01/2016    TRIG 52 08/20/2014    HDL 54 08/01/2016    HDL 57 08/20/2014    LDL 90 12/21/2016    LDL 87 08/01/2016    NHDL 98 08/01/2016       Assessment and Plan:    Type 1 diabetes mellitus with hyperglycemia (H)  Well controlled type 1 diabetes mellitus with an A1c of 6.7%. He prefers even tighter control.   He may try an extended manual bolus at night with snacks to reduce overnight highs.    - blood glucose monitoring (VIANNEY CONTOUR NEXT) test strip  Dispense: 450 each; Refill: 3  - insulin lispro (HUMALOG) 100 UNIT/ML injection  Dispense: 60 mL; Refill: 3  - Albumin Random Urine Quantitative with Creat Ratio  - Basic metabolic panel    Dyslipidemia  Patient would like to try improving lipids with alternating days of 10 and 20mg doses of crestor.   - rosuvastatin (CRESTOR) 10 MG tablet  Dispense: 135 tablet; Refill: 3  - Lipid panel reflex to direct LDL Non-fasting    Hypothyroidism, unspecified type  No complaints of symptoms of hypothyroidism. Will recheck labs and  refill his prescription.   - levothyroxine (LEVOTHROID) 112 MCG tablet  Dispense: 90 tablet; Refill: 3  - TSH with free T4 reflex    Vitamin D deficiency  Patient takes a vitamin D supplement and would like to know if he is within proper range.   - Vitamin D Deficiency      Follow-up: Return in about 6 months (around 4/16/2019).     >50% of 25 minute visit spent in face to face counseling, education and coordination of care related to options for better glycemic control as well as preventing, detecting, and treating hypoglycemia.       Scribe Disclosure:   I, Xu Castano, am serving as a scribe to document services personally performed by Boyd Adler MD at this visit, based upon the provider's statements to me. All documentation has been reviewed by the aforementioned provider prior to being entered into the official medical record.     Portions of this medical record were completed by a scribe. UPON MY REVIEW AND AUTHENTICATION BY ELECTRONIC SIGNATURE, this confirms (a) I performed the applicable clinical services, and (b) the record is accurate.      CHARLES Jaimes

## 2018-10-16 NOTE — MR AVS SNAPSHOT
After Visit Summary   10/16/2018    Schuyler Gallardo    MRN: [id number]           Patient Information     Date Of Birth          1970        Visit Information        Provider Department      10/16/2018 2:00 PM Boyd Adler MD [facility] Endocrinology        Today's Diagnoses     Hypothyroidism, unspecified type    -  1    Dyslipidemia        Type 1 diabetes mellitus with hyperglycemia (H)        Vitamin D deficiency           Follow-ups after your visit        Follow-up notes from your care team     Return in about 6 months (around 4/16/2019).      Your next 10 appointments already scheduled     Oct 16, 2018  6:45 PM CDT   LAB with [facility] ([facility])    [address] [place] [address] Floor  [facility] [address] [phone]           Please do not eat 10-12 hours before your appointment if you are coming in fasting for labs on lipids, cholesterol, or glucose (sugar). This does not apply to pregnant women. Water, hot tea and black coffee (with nothing added) are okay. Do not drink other fluids, diet soda or chew gum.              Future tests that were ordered for you today     Open Future Orders        Priority Expected Expires Ordered    Albumin Random Urine Quantitative with Creat Ratio Routine 10/16/2018 10/16/2019 10/16/2018    Basic metabolic panel Routine 10/16/2018 10/16/2019 10/16/2018    TSH with free T4 reflex Routine 10/16/2018 10/16/2019 10/16/2018    Vitamin D Deficiency Routine 4/14/2019 10/16/2019 10/16/2018    Lipid panel reflex to direct LDL Non-fasting Routine 10/16/2019 10/16/2019 10/16/2018            Who to contact     Please call your clinic at [phone] to:    Ask questions about your health    Make or cancel appointments    Discuss your medicines    Learn about your test results    Speak to your doctor            Additional Information About Your Visit        MyChart Information     [employer] is an electronic gateway that  "provides easy, online access to your medical records. With [employer], you can request a clinic appointment, read your test results, renew a prescription or communicate with your care team.     To sign up for [employer] visit the website at [email or web address]   You will be asked to enter the access code listed below, as well as some personal information. Please follow the directions to create your username and password.     Your access code is: [email or web address]  Expires: 2018  6:31 AM     Your access code will  in 90 days. If you need help or a new code, please contact your [facility] or call [phone] for assistance.        Care EveryWhere ID     This is your Care EveryWhere ID. This could be used by other organizations to access your [place] medical records  [id number]        Your Vitals Were     Pulse Height BMI (Body Mass Index)             82 1.778 m (5' 10\") 23.88 kg/m2          Blood Pressure from Last 3 Encounters:   10/16/18 133/82   17 147/82   16 129/80    Weight from Last 3 Encounters:   10/16/18 75.5 kg (166 lb 6.4 oz)   17 73.5 kg (162 lb)   16 80.7 kg (178 lb)                 Today's Medication Changes          These changes are accurate as of 10/16/18  4:35 PM.  If you have any questions, ask your nurse or doctor.               These medicines have changed or have updated prescriptions.        Dose/Directions    rosuvastatin 10 MG tablet   Commonly known as:  CRESTOR   This may have changed:    - how much to take  - how to take this  - when to take this  - additional instructions   Used for:  Dyslipidemia, Type 1 diabetes mellitus with hyperglycemia (H), Hypothyroidism, unspecified type, Vitamin D deficiency   Changed by:  Boyd Adler MD        TAKE 10 MG ALTERNATING WITH 20 MG DAILY.   Quantity:  135 tablet   Refills:  3            Where to get your medicines      These medications were sent to [facility] - [place], [place] - [address]  [address] [place] , [facility] [address]    Hours:  24-hours Phone:  [phone]     blood glucose monitoring test strip    insulin lispro 100 UNIT/ML injection    levothyroxine 112 MCG tablet    rosuvastatin 10 MG tablet                Primary Care Provider Fax #    Physician No Ref-Primary [phone]       No address on file        Equal Access to Services     DONNELL KIMBLE : Hadii aad ku hadasho Soomaali, waaxda luqadaha, qaybta kaalmada adeegyada, waxay idiin haynainn adeeg leo lalaurennaila . So [facility] [phone].    ATENCIÓN: Si habla español, tiene a dejesus disposición servicios gratuitos de asistencia lingüística. Llame al [phone].    We comply with applicable federal civil rights laws and [place] laws. We do not discriminate on the basis of race, color, national origin, age, disability, sex, sexual orientation, or gender identity.            Thank you!     Thank you for choosing [facility] ENDOCRINOLOGY  for your care. Our goal is always to provide you with excellent care. Hearing back from our patients is one way we can continue to improve our services. Please take a few minutes to complete the written survey that you may receive in the mail after your visit with us. Thank you!             Your Updated Medication List - Protect others around you: Learn how to safely use, store and throw away your medicines at www.disposemymeds.org.          This list is accurate as of 10/16/18  4:35 PM.  Always use your most recent med list.                   Brand Name Dispense Instructions for use Diagnosis    aspirin 81 MG tablet     30 tablet    Take 1 tablet (81 mg) by mouth daily    Type 1 diabetes mellitus without complication (H), Coronary artery disease due to calcified coronary lesion       blood glucose monitoring test strip    VIANNEY CONTOUR NEXT    450 each    Test 4-5 times daily / OR AS DIRECTED    Dyslipidemia, Type 1 diabetes mellitus with  hyperglycemia (H), Hypothyroidism, unspecified type, Vitamin D deficiency       insulin lispro 100 UNIT/ML injection    humaLOG    60 mL    Use in pump basal rate plus meal coverage approx 50-60 units daily    Type 1 diabetes mellitus with hyperglycemia (H), Dyslipidemia, Hypothyroidism, unspecified type, Vitamin D deficiency       insulin syringes (disposable) U-100 0.5 ML Misc           levothyroxine 112 MCG tablet    LEVOTHROID    90 tablet    Take 1 tablet (112 mcg) by mouth daily    Hypothyroidism, unspecified type, Dyslipidemia, Type 1 diabetes mellitus with hyperglycemia (H), Vitamin D deficiency       MULTIVITAMINS PO           rosuvastatin 10 MG tablet    CRESTOR    135 tablet    TAKE 10 MG ALTERNATING WITH 20 MG DAILY.    Dyslipidemia, Type 1 diabetes mellitus with hyperglycemia (H), Hypothyroidism, unspecified type, Vitamin D deficiency

## 2018-10-16 NOTE — PROGRESS NOTES
[facility]  Endocrinology  Boyd Adler MD  10/16/2018      Chief Complaint:   Diabetes    History of Present Illness:   Schuyler Gallardo is a 48 year old male with a history of type 1 diabetes mellitus and chronic lymphocytic thyroiditis here to follow up. Today he has no complaints, he denies having any acute illness since his last visit.    He use Medtronic 670G insulin pump, which was downloaded and reviewed.  Pump was in auto mode around 53% of the time. He thinks his A1c has been higher since starting 670 G but has helped avoid low's. He thinks that specifically needs more insulin overnight. . He feels that the sensor is accurate on days 1-4, but it starts to get worse and fails by day 5, and he rarely gets to use it on day 6/7. He runs out of sensors Friday of every week due to the sensor failure. Getting replacements takes a 45 minute phone call and is difficult. He usually runs on the treadmill in the evening between 7 and 9pm. He eats dinner before exercise. Sometimes he will have a snack before bed. When he eats before bed he his blood sugar often spikes. When he notices a high he will override it manually because the auto mode takes too long to adjust. This is usually after eating and he miscounts his carbohydrates. He thinks that if he stops using auto mode at bedtime he'll eliminate highs but may have more lows.     Denies numbness and tingling. He reports tolerating exercise well.     He is unable to go above 10mg Crestor because it makes his muscles hurt while running. In the past adding vitamin D was helpful. He would consider alternating between 10 and 20mg doses. He takes a vitamin D supplement.     He denies symptoms of hypothyroidism including fatigue and weight gain. His sleep is good.     Blood Glucose Monitoring:  We reviewed glucometer and CGM data together.    Average blood glucose of 151 with standard deviation of 52. Highest standard deviation is overnight and the morning. His sensor is  worn an average of 4 days per week. 73% in range, 21% between 180 and 250, 4% above 250, and 2% between 50 and 70.     Current Insulin Pump Settings:  Type of Pump: TixAlert Minimed: Model 670G  Current Settings:   BASAL RATES and times:  12   AM (midnight): 0.75 units/hour    1     AM: 0.7 units/hour   6     AM: 0.55 units/hour   10:30  AM: 0.45 units/hour   11:30   PM: 0.65 units/hour    CARB RATIO and times:  12   AM (midnight): 12   Corection Factor (Sensitivity) and times:  12   AM (midnight): 40 mg/dL   BLOOD GLUCOSE TARGET and times:  12   AM (midnight): 90 - 125   Total daily insulin: 32 unit(s), bolus 59% and basal 41%.     Diabetes monitoring and complications:  CAD: Yes: mild to moderate  Last eye exam results: : 05/21/2018   Dental exams: regularly  Microalbuminuria: undetected 11/21/2017  HTN: No  On Statin: Yes  On Aspirin: Yes  Depression: No  Erectile dysfunction: No    Review of Systems:   Pertinent items are noted in HPI.  All other systems are negative.    Active Medications:     Current Outpatient Prescriptions:      blood glucose monitoring ([employer] CONTOUR NEXT) test strip, Test 4-5 times daily / OR AS DIRECTED, Disp: 450 each, Rfl: 3     insulin lispro (HUMALOG) 100 UNIT/ML injection, Use in pump basal rate plus meal coverage approx 50-60 units daily, Disp: 60 mL, Rfl: 3     levothyroxine (LEVOTHROID) 112 MCG tablet, Take 1 tablet (112 mcg) by mouth daily, Disp: 90 tablet, Rfl: 3     rosuvastatin (CRESTOR) 10 MG tablet, TAKE 10 MG ALTERNATING WITH 20 MG DAILY., Disp: 135 tablet, Rfl: 3     aspirin 81 MG tablet, Take 1 tablet (81 mg) by mouth daily, Disp: 30 tablet, Rfl:      Insulin Syringes, Disposable, U-100 0.5 ML MISC, , Disp: , Rfl:      Multiple Vitamin (MULTIVITAMINS PO), , Disp: , Rfl:      [DISCONTINUED] insulin lispro (HUMALOG VIAL) 100 UNIT/ML VIAL, Use in pump basal rate plus meal coverage approx 50-60 units daily, Disp: 60 mL, Rfl: 3     [DISCONTINUED] levothyroxine (LEVOTHROID)  "112 MCG tablet, Take 1 tablet (112 mcg) by mouth daily, Disp: 90 tablet, Rfl: 3     [DISCONTINUED] rosuvastatin (CRESTOR) 10 MG tablet, Take 1 tablet (10 mg) by mouth daily, Disp: 90 tablet, Rfl: 3      Allergies:   Review of patient's allergies indicates no known allergies.      Past Medical History:  Type 1 diabetes mellitus  Dyslipidemia   Lymphocytic thyroiditis  Coronary artery disease     Past Surgical History:  No past surgical history on file.    Family History:   Paternal grandfather: macular degeneration      Social History:      Non smoker  Cardiologist, doing a talk on atrial fibrillation     Physical Exam:   /82  Pulse 82  Ht 1.778 m (5' 10\")  Wt 75.5 kg (166 lb 6.4 oz)  BMI 23.88 kg/m2     Wt Readings from Last 10 Encounters:   10/16/18 75.5 kg (166 lb 6.4 oz)   11/21/17 73.5 kg (162 lb)   08/02/16 80.7 kg (178 lb)   08/20/14 75.8 kg (167 lb)   08/09/13 82.2 kg (181 lb 4.8 oz)   07/31/12 79 kg (174 lb 3.2 oz)   10/19/11 76.2 kg (168 lb)      Constitutional: no distress, comfortable, pleasant   Eyes: anicteric, normal extra-ocular movements, No lig lag, retraction or proptosis  Ears, Nose and Throat: throat clear  Neck: supple, no thyromegaly.   Cardiovascular: regular rate and rhythm, normal S1 and S2, no murmurs  Respiratory: clear to auscultation, no wheezes or crackles, normal breath sounds   Gastrointestinal: nontender, no striae   Musculoskeletal: no edema   Skin:  no jaundice   Neurological: cranial nerves intact, normal strength, reflexes at patella and biceps normal, no tremor   Psychological: appropriate mood   Lymphatic: no cervical lymphadenopathy      Data:  Lab Results   Component Value Date     11/21/2017    POTASSIUM 4.5 11/21/2017    CHLORIDE 104 11/21/2017    CO2 32 11/21/2017    ANIONGAP 4 11/21/2017     (H) 08/01/2016    BUN 13 11/21/2017    CR 0.96 11/21/2017     Lab Results   Component Value Date    GFRESTIMATED 84 11/21/2017    GFRESTIMATED 78 " 08/01/2016    GFRESTIMATED 81 08/20/2014    GFRESTBLACK >90 11/21/2017    GFRESTBLACK >90   GFR Calc   08/01/2016    GFRESTBLACK >90   GFR Calc   08/20/2014      Lab Results   Component Value Date    MICROL <5 11/21/2017    UMALCR Unable to calculate due to low value 11/21/2017        Lab Results   Component Value Date    A1C 7.0 (H) 12/21/2016    A1C 6.6 (H) 08/01/2016    A1C 6.7 (H) 08/20/2014    A1C 6.5 (H) 08/08/2013    A1C 6.5 (H) 10/19/2011    HEMOGLOBINA1 7.3 (A) 11/21/2017    HEMOGLOBINA1 6.7 (H) 10/21/2009    HEMOGLOBINA1 6.6 (H) 09/18/2008     Lab Results   Component Value Date    CHOL 151 08/01/2016    CHOL 133 08/20/2014    TRIG 50 08/01/2016    TRIG 52 08/20/2014    HDL 54 08/01/2016    HDL 57 08/20/2014    LDL 90 12/21/2016    LDL 87 08/01/2016    NHDL 98 08/01/2016       Assessment and Plan:    Type 1 diabetes mellitus with hyperglycemia (H)  Well controlled type 1 diabetes mellitus with an A1c of 6.7%. He prefers even tighter control.   He may try an extended manual bolus at night with snacks to reduce overnight highs.    - blood glucose monitoring ([employer] CONTOUR NEXT) test strip  Dispense: 450 each; Refill: 3  - insulin lispro (HUMALOG) 100 UNIT/ML injection  Dispense: 60 mL; Refill: 3  - Albumin Random Urine Quantitative with Creat Ratio  - Basic metabolic panel    Dyslipidemia  Patient would like to try improving lipids with alternating days of 10 and 20mg doses of crestor.   - rosuvastatin (CRESTOR) 10 MG tablet  Dispense: 135 tablet; Refill: 3  - Lipid panel reflex to direct LDL Non-fasting    Hypothyroidism, unspecified type  No complaints of symptoms of hypothyroidism. Will recheck labs and refill his prescription.   - levothyroxine (LEVOTHROID) 112 MCG tablet  Dispense: 90 tablet; Refill: 3  - TSH with free T4 reflex    Vitamin D deficiency  Patient takes a vitamin D supplement and would like to know if he is within proper range.   - Vitamin D Deficiency       Follow-up: Return in about 6 months (around 4/16/2019).     >50% of 25 minute visit spent in face to face counseling, education and coordination of care related to options for better glycemic control as well as preventing, detecting, and treating hypoglycemia.       Scribe Disclosure:   I, Xu Castano, am serving as a scribe to document services personally performed by Boyd Adler MD at this visit, based upon the provider's statements to me. All documentation has been reviewed by the aforementioned provider prior to being entered into the official medical record.     Portions of this medical record were completed by a scribe. UPON MY REVIEW AND AUTHENTICATION BY ELECTRONIC SIGNATURE, this confirms (a) I performed the applicable clinical services, and (b) the record is accurate.      CHARLES Jaimes

## 2018-10-24 ENCOUNTER — TELEPHONE (OUTPATIENT)
Dept: EDUCATION SERVICES | Facility: CLINIC | Age: 48
End: 2018-10-24

## 2018-10-24 DIAGNOSIS — E10.65 TYPE 1 DIABETES MELLITUS WITH HYPERGLYCEMIA (H): Primary | ICD-10-CM

## 2019-06-18 ENCOUNTER — TELEPHONE (OUTPATIENT)
Dept: ENDOCRINOLOGY | Facility: CLINIC | Age: 49
End: 2019-06-18

## 2019-06-18 NOTE — TELEPHONE ENCOUNTER
[facility] Call Center    Phone Message    May a detailed message be left on voicemail: yes    Reason for Call: Order(s): Glucose sensor transmitter  Reason for requested: Current one is damaged will need a new order sent  Date needed: asap  Provider name: Dr. Adler to [place] (fax number not available)      Action Taken: Message routed to:  [facility] ([facility]): santiago husain

## 2019-06-19 ENCOUNTER — MEDICAL CORRESPONDENCE (OUTPATIENT)
Dept: HEALTH INFORMATION MANAGEMENT | Facility: CLINIC | Age: 49
End: 2019-06-19

## 2019-06-19 NOTE — TELEPHONE ENCOUNTER
Forms received from: [employer]     Forms were CMN for oow transmitter     Faxed Date:    In Dr. Burdick box for signature 6/19/19

## 2019-06-20 ENCOUNTER — TELEPHONE (OUTPATIENT)
Dept: EDUCATION SERVICES | Facility: CLINIC | Age: 49
End: 2019-06-20

## 2019-06-20 NOTE — TELEPHONE ENCOUNTER
Veronique Healy MD signed off on CMN due to time line.  Patient needs transmitter by Friday.  The completed form was e-mailed to Michelle Campa 6/19/2019. Kenzie Hughes RN,CDE

## 2019-06-25 NOTE — TELEPHONE ENCOUNTER
E-mail from Ana Campa at [employer]:    Hello,    I am working on a replacement transmitter order for Schuyler Gallardo.  This is an urgent request as Schuyler is going out of town this Friday and is hoping to have it delivered before then.  Thank you so much for your help, we will need the attached CMN filled out and signed.  Thanks!         Thank you,    Ana Campa    [facility]   [place]    Phone: [phone] ext. [phone]  Fax: [phone]    [email or web address]    [email or web address]     Alleviate pain, restore health, and extend    Reply:    Here is the LOMN.  I had Dr. Healy sign it as Dr. Burdick is out of the office.

## 2019-09-16 ENCOUNTER — DOCUMENTATION ONLY (OUTPATIENT)
Dept: CARE COORDINATION | Facility: CLINIC | Age: 49
End: 2019-09-16

## 2019-10-14 NOTE — PROGRESS NOTES
[facility]  Endocrinology  Amicitlaly Adler MD  10/15/2019      Chief Complaint:   Diabetes    History of Present Illness:   Schuyler Gallardo is a 49 year old male with a history of type 1 diabetes mellitus and chronic lymphocytic thyroiditis here to follow up.    He does have a CGM but does not always wear it. CGM can be inconvenient with alarms going off while he is performing procedures. He is using his Medtronic 670G, mostly in manual mode. He feels his insulin to carb ratio for meals is fine. Reports  he tends to run high overnight and wakes with fasting hyperglycemia. Auto mode had not been doing a good job of correcting his blood glucose overnight. His solution was to stop eating snacks after 8pm or try a low carb snack.. He had a morning high of 400 followed by a low of 40 after taking a correction. He exercises in the evenings, right now, he does 1 hour of tennis 2x per week and running on a treadmill 2x per week. Denies early satiety and nausea.    His blood pressure was high during rooming but he was rushing into his appointment. He plans to do some at home readings.     He is taking Crestor but has not tolerated higher doses of statin. He has been taking 10mg. On weeks he skips he feels better. He reports muscle soreness with rigrous exercise while on statins. . He has tried other statins and have been more poorly tolerated. He reports having a high coronary calcium score and coronary artery disease without obstruction on screening cardiac testing. He is open to starting Repatha as an alternative because he thinks it may be possible he tolerates it better.  Denies symptoms of claudication.   Over all feels well.     Blood Glucose Monitoring:    A1c was 6.4% today. He does not consistently wear a sensor. Per glucometer he has occasional morning highs.    Current Insulin Pump Settings:  Type of Pump: Medtronic Minimed: Model 670G  Current Settings:   BASAL RATES and times:  12   AM (midnight): 0.75 units/hour     1     AM: 0.7 units/hour   6     AM: 0.55 units/hour   10:30  AM: 0.45 units/hour   11:30   PM: 0.65 units/hour    CARB RATIO and times:  12   AM (midnight): 12   Corection Factor (Sensitivity) and times:  12   AM (midnight): 40 mg/dL   BLOOD GLUCOSE TARGET and times:  12   AM (midnight): 90 - 125       Amount of Time Insulin is Active:  3 hrs    Diabetes monitoring and complications:  CAD: Yes  Last eye exam results: 5/21/2019. No DR  Microalbuminuria: negative 2017   Neuropathy: No  HTN: No  On Statin: Yes  On Aspirin: Yes  Depression: Yes  Erectile dysfunction: No    Patient Supplied Answers To Diabetic Questionnaire  No flowsheet data found.     Review of Systems:   Pertinent items are noted in HPI.  All other systems are negative.    Active Medications:     Current Outpatient Medications:      aspirin 81 MG tablet, Take 1 tablet (81 mg) by mouth daily, Disp: 30 tablet, Rfl:      blood glucose monitoring ([employer] CONTOUR NEXT) test strip, Test 4-5 times daily / OR AS DIRECTED, Disp: 450 each, Rfl: 3     Continuous Blood Gluc Sensor (MINIMED GUARDIAN SENSOR 3) MISC, 1 each once a week Change every 5 days, Disp: 10 each, Rfl: 11     insulin lispro (HUMALOG) 100 UNIT/ML injection, Use in pump basal rate plus meal coverage approx 50-60 units daily, Disp: 60 mL, Rfl: 3     Insulin Syringes, Disposable, U-100 0.5 ML MISC, , Disp: , Rfl:      levothyroxine (LEVOTHROID) 112 MCG tablet, Take 1 tablet (112 mcg) by mouth daily, Disp: 90 tablet, Rfl: 3     Multiple Vitamin (MULTIVITAMINS PO), , Disp: , Rfl:      rosuvastatin (CRESTOR) 10 MG tablet, Take 1 tablet (10 mg) by mouth daily, Disp: 90 tablet, Rfl: 3      Allergies:   Patient has no known allergies.      Past Medical History:  Type 1 diabetes mellitus  Dyslipidemia  Chronic lymphocytic thyroiditis  Coronary artery disease    Family History:   Paternal grandmother: macular degeneration      Social History:     Non-smoker     Physical Exam:   BP (!) 142/76    "Pulse 72   Ht 1.778 m (5' 10\")   Wt 75.7 kg (166 lb 12.8 oz)   BMI 23.93 kg/m       Wt Readings from Last 10 Encounters:   10/15/19 75.7 kg (166 lb 12.8 oz)   10/16/18 75.5 kg (166 lb 6.4 oz)   11/21/17 73.5 kg (162 lb)   08/02/16 80.7 kg (178 lb)   08/20/14 75.8 kg (167 lb)   08/09/13 82.2 kg (181 lb 4.8 oz)   07/31/12 79 kg (174 lb 3.2 oz)   10/19/11 76.2 kg (168 lb)        Wt Readings from Last 1 Encounters:   10/15/19 75.7 kg (166 lb 12.8 oz)     Constitutional: no distress, comfortable, pleasant   Eyes: anicteric, , normal extra-ocular movements   Neck:  no thyromegaly.   Cardiovascular: regular rate and rhythm, normal S1 and S2, no murmurs  Respiratory: clear to auscultation, no wheezes or crackles, normal breath sounds   Musculoskeletal:  no edema   Skin: no jaundice  Neurological:  A and O x 3, good historian.  Psychological: appropriate mood, good eye contact, normal affect     Data:  Lab Results   Component Value Date     11/21/2017    POTASSIUM 4.5 11/21/2017    CHLORIDE 104 11/21/2017    CO2 32 11/21/2017    ANIONGAP 4 11/21/2017     (H) 08/01/2016    BUN 13 11/21/2017    CR 0.96 11/21/2017     Lab Results   Component Value Date    GFRESTIMATED 84 11/21/2017    GFRESTIMATED 78 08/01/2016    GFRESTIMATED 81 08/20/2014    GFRESTBLACK >90 11/21/2017    GFRESTBLACK >90   GFR Calc   08/01/2016    GFRESTBLACK >90   GFR Calc   08/20/2014      Lab Results   Component Value Date    MICROL <5 11/21/2017    UMALCR Unable to calculate due to low value 11/21/2017        Lab Results   Component Value Date    A1C 7.0 (H) 12/21/2016    A1C 6.6 (H) 08/01/2016    A1C 6.7 (H) 08/20/2014    A1C 6.5 (H) 08/08/2013    A1C 6.5 (H) 10/19/2011    HEMOGLOBINA1 6.4 (A) 10/15/2019    HEMOGLOBINA1 7.3 (A) 11/21/2017    HEMOGLOBINA1 6.7 (H) 10/21/2009    HEMOGLOBINA1 6.6 (H) 09/18/2008     No results found for: CPEPT, GADAB, ISCAB    Lab Results   Component Value Date    CHOL 151 " 08/01/2016    CHOL 133 08/20/2014    TRIG 50 08/01/2016    TRIG 52 08/20/2014    HDL 54 08/01/2016    HDL 57 08/20/2014    LDL 90 12/21/2016    LDL 87 08/01/2016    NHDL 98 08/01/2016       Assessment and Plan:  Type 1 diabetes mellitus (H)  Well controlled with his current pump settings. A1c today was 6.4%.   Due for ruotine labs  - Hemoglobin A1c POCT    Dyslipidemia  LDL not in range. He does not tolerate higher doses of statin. Non obstructive disease on cardiac CT.  Will refer to Dr. Wick in cardiology to discuss his options for treatment, specifically use of PCSK9 inhibitor. Adding Zetia could be another option.   - rosuvastatin (CRESTOR) 10 MG tablet  Dispense: 90 tablet; Refill: 3  - CARDIOLOGY EVAL ADULT REFERRAL    Hypothyroidism    check labs     Follow-up: Return in about 1 year (around 10/15/2020).       Scribe Disclosure:  I, Xu Castano, am serving as a scribe to document services personally performed by Boyd Adler MD at this visit, based upon the provider's statements to me. All documentation has been reviewed by the aforementioned provider prior to being entered into the official medical record.     Portions of this medical record were completed by a scribe. UPON MY REVIEW AND AUTHENTICATION BY ELECTRONIC SIGNATURE, this confirms (a) I performed the applicable clinical services, and (b) the record is accurate.      CHARLES Payne

## 2019-10-15 ENCOUNTER — OFFICE VISIT (OUTPATIENT)
Dept: ENDOCRINOLOGY | Facility: CLINIC | Age: 49
End: 2019-10-15
Payer: COMMERCIAL

## 2019-10-15 VITALS
DIASTOLIC BLOOD PRESSURE: 76 MMHG | HEART RATE: 72 BPM | WEIGHT: 166.8 LBS | BODY MASS INDEX: 23.88 KG/M2 | SYSTOLIC BLOOD PRESSURE: 142 MMHG | HEIGHT: 70 IN

## 2019-10-15 DIAGNOSIS — E03.9 HYPOTHYROIDISM, UNSPECIFIED TYPE: ICD-10-CM

## 2019-10-15 DIAGNOSIS — E10.65 TYPE 1 DIABETES MELLITUS WITH HYPERGLYCEMIA (H): ICD-10-CM

## 2019-10-15 DIAGNOSIS — E55.9 VITAMIN D DEFICIENCY: ICD-10-CM

## 2019-10-15 DIAGNOSIS — E78.5 DYSLIPIDEMIA: Primary | ICD-10-CM

## 2019-10-15 DIAGNOSIS — E78.5 DYSLIPIDEMIA: ICD-10-CM

## 2019-10-15 LAB
ANION GAP SERPL CALCULATED.3IONS-SCNC: 4 MMOL/L (ref 3–14)
BUN SERPL-MCNC: 8 MG/DL (ref 7–30)
CALCIUM SERPL-MCNC: 9.3 MG/DL (ref 8.5–10.1)
CHLORIDE SERPL-SCNC: 102 MMOL/L (ref 94–109)
CHOLEST SERPL-MCNC: 193 MG/DL
CO2 SERPL-SCNC: 32 MMOL/L (ref 20–32)
CREAT SERPL-MCNC: 1.07 MG/DL (ref 0.66–1.25)
CREAT UR-MCNC: 50 MG/DL
GFR SERPL CREATININE-BSD FRML MDRD: 82 ML/MIN/{1.73_M2}
GLUCOSE SERPL-MCNC: 156 MG/DL (ref 70–99)
HBA1C MFR BLD: 6.4 % (ref 4.3–6)
HDLC SERPL-MCNC: 64 MG/DL
LDLC SERPL CALC-MCNC: 115 MG/DL
MICROALBUMIN UR-MCNC: <5 MG/L
MICROALBUMIN/CREAT UR: NORMAL MG/G CR (ref 0–17)
NONHDLC SERPL-MCNC: 128 MG/DL
POTASSIUM SERPL-SCNC: 4.4 MMOL/L (ref 3.4–5.3)
SODIUM SERPL-SCNC: 138 MMOL/L (ref 133–144)
TRIGL SERPL-MCNC: 66 MG/DL
TSH SERPL DL<=0.005 MIU/L-ACNC: 2.39 MU/L (ref 0.4–4)

## 2019-10-15 RX ORDER — ROSUVASTATIN CALCIUM 10 MG/1
10 TABLET, COATED ORAL DAILY
Qty: 90 TABLET | Refills: 3 | Status: SHIPPED | OUTPATIENT
Start: 2019-10-15 | End: 2022-01-10

## 2019-10-15 ASSESSMENT — PAIN SCALES - GENERAL: PAINLEVEL: NO PAIN (0)

## 2019-10-15 ASSESSMENT — MIFFLIN-ST. JEOR: SCORE: 1637.85

## 2019-10-15 NOTE — LETTER
10/15/2019       RE: Schuyler Gallardo  [address] [facility] [address]     Dear Colleague,    Thank you for referring your patient, Schuyler Gallardo, to the [facility] ENDOCRINOLOGY at [facility]. Please see a copy of my visit note below.    [facility]  Endocrinology  Boyd Adler MD  10/15/2019      Chief Complaint:   Diabetes    History of Present Illness:   Schuyler Gallardo is a 49 year old male with a history of type 1 diabetes mellitus and chronic lymphocytic thyroiditis here to follow up.    He does have a CGM but does not always wear it. CGM can be inconvenient with alarms going off while he is performing procedures. He is using his Medtronic 670G, mostly in manual mode. He feels his insulin to carb ratio for meals is fine. Reports  he tends to run high overnight and wakes with fasting hyperglycemia. Auto mode had not been doing a good job of correcting his blood glucose overnight. His solution was to stop eating snacks after 8pm or try a low carb snack.. He had a morning high of 400 followed by a low of 40 after taking a correction. He exercises in the evenings, right now, he does 1 hour of tennis 2x per week and running on a treadmill 2x per week. Denies early satiety and nausea.    His blood pressure was high during rooming but he was rushing into his appointment. He plans to do some at home readings.     He is taking Crestor but has not tolerated higher doses of statin. He has been taking 10mg. On weeks he skips he feels better. He reports muscle soreness with rigrous exercise while on statins. . He has tried other statins and have been more poorly tolerated. He reports having a high coronary calcium score and coronary artery disease without obstruction on screening cardiac testing. He is open to starting Repatha as an alternative because he thinks it may be possible he tolerates it better.  Denies symptoms of claudication.   Over all feels well.     Blood Glucose  Monitoring:    A1c was 6.4% today. He does not consistently wear a sensor. Per glucometer he has occasional morning highs.    Current Insulin Pump Settings:  Type of Pump: Medtronic Minimed: Model 670G  Current Settings:   BASAL RATES and times:  12   AM (midnight): 0.75 units/hour    1     AM: 0.7 units/hour   6     AM: 0.55 units/hour   10:30  AM: 0.45 units/hour   11:30   PM: 0.65 units/hour    CARB RATIO and times:  12   AM (midnight): 12   Corection Factor (Sensitivity) and times:  12   AM (midnight): 40 mg/dL   BLOOD GLUCOSE TARGET and times:  12   AM (midnight): 90 - 125       Amount of Time Insulin is Active:  3 hrs    Diabetes monitoring and complications:  CAD: Yes  Last eye exam results: 5/21/2019. No DR  Microalbuminuria: negative 2017   Neuropathy: No  HTN: No  On Statin: Yes  On Aspirin: Yes  Depression: Yes  Erectile dysfunction: No    Patient Supplied Answers To Diabetic Questionnaire  No flowsheet data found.     Review of Systems:   Pertinent items are noted in HPI.  All other systems are negative.    Active Medications:     Current Outpatient Medications:      aspirin 81 MG tablet, Take 1 tablet (81 mg) by mouth daily, Disp: 30 tablet, Rfl:      blood glucose monitoring (VIANNEY CONTOUR NEXT) test strip, Test 4-5 times daily / OR AS DIRECTED, Disp: 450 each, Rfl: 3     Continuous Blood Gluc Sensor (MINIMED GUARDIAN SENSOR 3) MISC, 1 each once a week Change every 5 days, Disp: 10 each, Rfl: 11     insulin lispro (HUMALOG) 100 UNIT/ML injection, Use in pump basal rate plus meal coverage approx 50-60 units daily, Disp: 60 mL, Rfl: 3     Insulin Syringes, Disposable, U-100 0.5 ML MISC, , Disp: , Rfl:      levothyroxine (LEVOTHROID) 112 MCG tablet, Take 1 tablet (112 mcg) by mouth daily, Disp: 90 tablet, Rfl: 3     Multiple Vitamin (MULTIVITAMINS PO), , Disp: , Rfl:      rosuvastatin (CRESTOR) 10 MG tablet, Take 1 tablet (10 mg) by mouth daily, Disp: 90 tablet, Rfl: 3      Allergies:   Patient has no  "known allergies.      Past Medical History:  Type 1 diabetes mellitus  Dyslipidemia  Chronic lymphocytic thyroiditis  Coronary artery disease    Family History:   Paternal grandmother: macular degeneration      Social History:     Non-smoker     Physical Exam:   BP (!) 142/76   Pulse 72   Ht 1.778 m (5' 10\")   Wt 75.7 kg (166 lb 12.8 oz)   BMI 23.93 kg/m        Wt Readings from Last 10 Encounters:   10/15/19 75.7 kg (166 lb 12.8 oz)   10/16/18 75.5 kg (166 lb 6.4 oz)   11/21/17 73.5 kg (162 lb)   08/02/16 80.7 kg (178 lb)   08/20/14 75.8 kg (167 lb)   08/09/13 82.2 kg (181 lb 4.8 oz)   07/31/12 79 kg (174 lb 3.2 oz)   10/19/11 76.2 kg (168 lb)        Wt Readings from Last 1 Encounters:   10/15/19 75.7 kg (166 lb 12.8 oz)     Constitutional: no distress, comfortable, pleasant   Eyes: anicteric, , normal extra-ocular movements   Neck:  no thyromegaly.   Cardiovascular: regular rate and rhythm, normal S1 and S2, no murmurs  Respiratory: clear to auscultation, no wheezes or crackles, normal breath sounds   Musculoskeletal:  no edema   Skin: no jaundice  Neurological:  A and O x 3, good historian.  Psychological: appropriate mood, good eye contact, normal affect     Data:  Lab Results   Component Value Date     11/21/2017    POTASSIUM 4.5 11/21/2017    CHLORIDE 104 11/21/2017    CO2 32 11/21/2017    ANIONGAP 4 11/21/2017     (H) 08/01/2016    BUN 13 11/21/2017    CR 0.96 11/21/2017     Lab Results   Component Value Date    GFRESTIMATED 84 11/21/2017    GFRESTIMATED 78 08/01/2016    GFRESTIMATED 81 08/20/2014    GFRESTBLACK >90 11/21/2017    GFRESTBLACK >90   GFR Calc   08/01/2016    GFRESTBLACK >90   GFR Calc   08/20/2014      Lab Results   Component Value Date    MICROL <5 11/21/2017    UMALCR Unable to calculate due to low value 11/21/2017        Lab Results   Component Value Date    A1C 7.0 (H) 12/21/2016    A1C 6.6 (H) 08/01/2016    A1C 6.7 (H) 08/20/2014    A1C " 6.5 (H) 08/08/2013    A1C 6.5 (H) 10/19/2011    HEMOGLOBINA1 6.4 (A) 10/15/2019    HEMOGLOBINA1 7.3 (A) 11/21/2017    HEMOGLOBINA1 6.7 (H) 10/21/2009    HEMOGLOBINA1 6.6 (H) 09/18/2008     No results found for: CPEPT, GADAB, ISCAB    Lab Results   Component Value Date    CHOL 151 08/01/2016    CHOL 133 08/20/2014    TRIG 50 08/01/2016    TRIG 52 08/20/2014    HDL 54 08/01/2016    HDL 57 08/20/2014    LDL 90 12/21/2016    LDL 87 08/01/2016    NHDL 98 08/01/2016       Assessment and Plan:  Type 1 diabetes mellitus (H)  Well controlled with his current pump settings. A1c today was 6.4%.   Due for ruotine labs  - Hemoglobin A1c POCT    Dyslipidemia  LDL not in range. He does not tolerate higher doses of statin. Non obstructive disease on cardiac CT.  Will refer to Dr. Wick in cardiology to discuss his options for treatment, specifically use of PCSK9 inhibitor. Adding Zetia could be another option.   - rosuvastatin (CRESTOR) 10 MG tablet  Dispense: 90 tablet; Refill: 3  - CARDIOLOGY EVAL ADULT REFERRAL    Hypothyroidism    check labs     Follow-up: Return in about 1 year (around 10/15/2020).       Scribe Disclosure:  I, Xu Castano, am serving as a scribe to document services personally performed by Boyd Adler MD at this visit, based upon the provider's statements to me. All documentation has been reviewed by the aforementioned provider prior to being entered into the official medical record.     Portions of this medical record were completed by a scribe. UPON MY REVIEW AND AUTHENTICATION BY ELECTRONIC SIGNATURE, this confirms (a) I performed the applicable clinical services, and (b) the record is accurate.      CHARLES Payne

## 2019-10-15 NOTE — NURSING NOTE
Chief Complaint   Patient presents with     RECHECK     Type 1 Diabetes     Capillary puncture performed for Hemoglobin A1C test. Patient tolerated well.    Bridgette Krishnamurthy MA

## 2019-10-16 LAB — DEPRECATED CALCIDIOL+CALCIFEROL SERPL-MC: 47 UG/L (ref 20–75)

## 2019-10-22 DIAGNOSIS — E78.5 DYSLIPIDEMIA: ICD-10-CM

## 2019-10-22 RX ORDER — ROSUVASTATIN CALCIUM 20 MG/1
TABLET, COATED ORAL
Qty: 70 TABLET | Refills: 3 | OUTPATIENT
Start: 2019-10-22

## 2019-11-21 DIAGNOSIS — E78.5 DYSLIPIDEMIA: ICD-10-CM

## 2019-11-21 DIAGNOSIS — E10.65 TYPE 1 DIABETES MELLITUS WITH HYPERGLYCEMIA (H): ICD-10-CM

## 2019-11-21 DIAGNOSIS — E55.9 VITAMIN D DEFICIENCY: ICD-10-CM

## 2019-11-21 DIAGNOSIS — E03.9 HYPOTHYROIDISM, UNSPECIFIED TYPE: ICD-10-CM

## 2019-11-21 RX ORDER — LEVOTHYROXINE SODIUM 112 UG/1
112 TABLET ORAL DAILY
Qty: 90 TABLET | Refills: 3 | Status: SHIPPED | OUTPATIENT
Start: 2019-11-21 | End: 2020-12-24

## 2019-11-21 NOTE — TELEPHONE ENCOUNTER
Last Clinic Visit: 10/15/19 with recommended 1 year follow up, no visits scheduled  Last TSH within normal limits 10/2019

## 2020-02-18 DIAGNOSIS — E10.65 TYPE 1 DIABETES MELLITUS WITH HYPERGLYCEMIA (H): ICD-10-CM

## 2020-02-18 DIAGNOSIS — E03.9 HYPOTHYROIDISM, UNSPECIFIED TYPE: ICD-10-CM

## 2020-02-18 DIAGNOSIS — E55.9 VITAMIN D DEFICIENCY: ICD-10-CM

## 2020-02-18 DIAGNOSIS — E78.5 DYSLIPIDEMIA: ICD-10-CM

## 2020-02-18 NOTE — TELEPHONE ENCOUNTER
insulin lispro (U-100) 100 unit/mL subcutaneous solution     Last Written Prescription Date:  10/16/2018  Last Fill Quantity: 60 mL,   # refills: 3  Last Office Visit : 10/15/2019  Future Office visit:  None    Routing refill request to provider for review/approval because:  Drug not on the [facility], [facility] or [facility] refill protocol or controlled substance    Radha Norton RN  Central Triage Red Flags/Med Refills

## 2020-06-02 ENCOUNTER — MEDICAL CORRESPONDENCE (OUTPATIENT)
Dept: HEALTH INFORMATION MANAGEMENT | Facility: CLINIC | Age: 50
End: 2020-06-02

## 2020-06-10 ENCOUNTER — TELEPHONE (OUTPATIENT)
Dept: ENDOCRINOLOGY | Facility: CLINIC | Age: 50
End: 2020-06-10

## 2020-06-10 NOTE — TELEPHONE ENCOUNTER
Emailing DMV form to be signed  by Dr Adler. Requesting max  years before renewal. Once received Thursday AM we will email it onto Dr Adler for signature. Libertad Duggan RN on 6/10/2020 at 12:25 PM

## 2020-06-10 NOTE — TELEPHONE ENCOUNTER
[facility] Call Center    Phone Message    May a detailed message be left on voicemail: yes     Reason for Call: Patient's wife Tricia called requesting a call back from Dr. Adler's nurse asadonato. Tricia stated she has some urgent questions but wouldn't elaborate details    Action Taken: Message routed to:  [facility] Diabetes/Endocrine    Travel Screening: Not Applicable

## 2020-10-02 ENCOUNTER — MEDICAL CORRESPONDENCE (OUTPATIENT)
Dept: HEALTH INFORMATION MANAGEMENT | Facility: CLINIC | Age: 50
End: 2020-10-02

## 2020-10-08 ENCOUNTER — DOCUMENTATION ONLY (OUTPATIENT)
Dept: ENDOCRINOLOGY | Facility: CLINIC | Age: 50
End: 2020-10-08

## 2020-10-26 ENCOUNTER — TELEPHONE (OUTPATIENT)
Dept: ENDOCRINOLOGY | Facility: CLINIC | Age: 50
End: 2020-10-26

## 2020-10-26 NOTE — TELEPHONE ENCOUNTER
[facility] Call Center    Phone Message    May a detailed message be left on voicemail: yes     Reason for Call: Other: Pt's wife is requesting a call back from Nurse Maurer. No further explanation was provided for call back request. Please call back at: [phone]     Action Taken: Message routed to:  [facility] endo    Travel Screening: Not Applicable

## 2020-10-29 DIAGNOSIS — E10.65 TYPE 1 DIABETES MELLITUS WITH HYPERGLYCEMIA (H): Primary | ICD-10-CM

## 2020-11-02 ENCOUNTER — TELEPHONE (OUTPATIENT)
Dept: ENDOCRINOLOGY | Facility: CLINIC | Age: 50
End: 2020-11-02

## 2020-11-02 NOTE — TELEPHONE ENCOUNTER
----- Message from Bridgette Krishnamurthy MA sent at 10/29/2020 11:25 AM CDT -----    ----- Message -----  From: Jaymie Rojas RN  Sent: 10/29/2020  10:55 AM CDT  To: [facility] Endocrinology [facility]    [place] Team: Please fax orders. Thank you.   ----- Message -----  From: Boyd Adler MD  Sent: 10/29/2020  10:50 AM CDT  To: [facility] T[facility], #    Please schedule a virtual visit for Dr. Gallardo to see me on Nov 18 at 11 AM. He had reached out to me over the email to request this visit.    I have ordered future labs but he would like to do them at [facility]. Not sure if we would need to fax the orders.    thanks

## 2020-11-17 NOTE — PROGRESS NOTES
"dm 1  Barbara Sullivan [facility]    Outcome for 11/18/20 10:34 AM :Glucose sent via Email    Schuyler Gallardo is a 50 year old male who is being evaluated via a billable video visit.      The patient has been notified of following:     \"This video visit will be conducted via a call between you and your physician/provider. We have found that certain health care needs can be provided without the need for an in-person physical exam.  This service lets us provide the care you need with a video conversation.  If a prescription is necessary we can send it directly to your pharmacy.  If lab work is needed we can place an order for that and you can then stop by our lab to have the test done at a later time.    Video visits are billed at different rates depending on your insurance coverage.  Please reach out to your insurance provider with any questions.    If during the course of the call the physician/provider feels a video visit is not appropriate, you will not be charged for this service.\"    Patient has given verbal consent for Video visit? Yes  How would you like to obtain your AVS? Mail a copy  If you are dropped from the video visit, the video invite should be resent to: Send to e-mail at: [email or web address]  Will anyone else be joining your video visit? [facility]  Endocrinology  Amir Drew Adler MD  11/18/2020      Chief Complaint:   Diabetes    History of Present Illness:   Schuyler Gallardo is a 50 year old male with a history of type 1 diabetes mellitus and chronic lymphocytic thyroiditis here to follow up.    Sathish Gallardo uses a Medtronic 670 G insulin pump system to manage his diabetes.  He is overall happy with the system but looking forward to upgrading to the 780 G system once it is approved.  His recent A1c was 6.8%.  His insulin pump is not set up for data sharing through [employer] so we were not able to review pump and glucose data.  He reports that overall his glucose readings are in good range with no consistent " pattern of hyper or hypoglycemia.  He feels that the 670 G system auto mode is not aggressive in lowering glucose readings.  He can feel symptoms of low blood sugar and denies any severe hypoglycemia episode.    He exercises 3-4 times per week.  Overall he feels well and denies any acute complaints today.  Denies any symptoms of chest pain or pressure, excessive shortness of breath, nausea vomiting or diarrhea, early satiety, tingling or numbness in fingers or toes.  He monitors blood pressure at home and reports that readings are in range.  He reports that he has lost a few pounds over the last several months and his current weight is around 159 pounds.  Since the last visit he had increased the dose of Crestor to 10 mg daily and is tolerating it without any muscle symptoms.  In the past he has had some muscle related symptoms with exercise with statins but does have resolved.  Per patient report his recent LDL was 78.    Blood Glucose Monitoring:    We did not have glucose data to review today    Current Insulin Pump Settings:  Type of Pump: Medtronic Minimed: Model 670G-pump settings are from previous note  Current Settings:   BASAL RATES and times:  12   AM (midnight): 0.75 units/hour    1     AM: 0.7 units/hour   6     AM: 0.55 units/hour   10:30  AM: 0.45 units/hour   11:30   PM: 0.65 units/hour    CARB RATIO and times:  12   AM (midnight): 12   Corection Factor (Sensitivity) and times:  12   AM (midnight): 40 mg/dL   BLOOD GLUCOSE TARGET and times:  12   AM (midnight): 90 - 125       Amount of Time Insulin is Active:  3 hrs    Diabetes monitoring and complications:  CAD: Yes  Last eye exam results: 5/21/2019. No DR  Microalbuminuria: negative   Neuropathy: No  HTN: No  On Statin: Yes  On Aspirin: Yes  Depression: Yes  Erectile dysfunction: No    Patient Supplied Answers To Diabetic Questionnaire  No flowsheet data found.     Review of Systems:   Pertinent items are noted in HPI.  All other systems are  negative.    Active Medications:     Current Outpatient Medications:      aspirin 81 MG tablet, Take 1 tablet (81 mg) by mouth daily, Disp: 30 tablet, Rfl:      blood glucose monitoring (VIANNEY CONTOUR NEXT) test strip, Test 4-5 times daily / OR AS DIRECTED, Disp: 450 each, Rfl: 3     Continuous Blood Gluc Sensor (MINIMED GUARDIAN SENSOR 3) MISC, 1 each once a week Change every 5 days, Disp: 10 each, Rfl: 11     insulin lispro (HUMALOG) 100 UNIT/ML vial, Use in pump basal rate plus meal coverage approx 50-60 units daily, Disp: 60 mL, Rfl: 3     Insulin Syringes, Disposable, U-100 0.5 ML MISC, , Disp: , Rfl:      levothyroxine (SYNTHROID/LEVOTHROID) 112 MCG tablet, Take 1 tablet (112 mcg) by mouth daily, Disp: 90 tablet, Rfl: 3     Multiple Vitamin (MULTIVITAMINS PO), , Disp: , Rfl:      rosuvastatin (CRESTOR) 10 MG tablet, Take 1 tablet (10 mg) by mouth daily, Disp: 90 tablet, Rfl: 3      Allergies:   Patient has no known allergies.      Past Medical History:  Type 1 diabetes mellitus  Dyslipidemia  Chronic lymphocytic thyroiditis  Coronary artery disease    Family History:   Paternal grandmother: macular degeneration      Social History:     Non-smoker     Physical Exam:   There were no vitals taken for this visit.     Constitutional: no distress, comfortable, pleasant   Psychological: appropriate mood, good eye contact, normal affect     Data:  Recent labs reviewed in care everywhere    Assessment and Plan:  Type 1 diabetes mellitus (H)  Overall good control with A1c of 6.8%  Set up pump for data sharing through [employer]  Schedule eye exam in the coming months   Intranasal glucagon emergency kit prescribed    Dyslipidemia  LDL in range and tolerating current dose of Crestor    Hypothyroidism   Recent TSH was in range continue current dose of levothyroxine    Follow-up: Return to clinic in 6 months to 1 year    Note: Chart documentation done in part with Dragon Voice Recognition software. Although reviewed  after completion, some word and grammatical errors may remain.  Please consider this when interpreting information in this chart    CHARLES Payne    Video-Visit Details    Type of service:  Video Visit    Total video visit time 22 minutes    Originating Location (pt. Location): Home    Distant Location (provider location):  [facility] ENDOCRINOLOGY CLINIC [place]     Platform used for Video Visit: [employer]

## 2020-11-18 ENCOUNTER — VIRTUAL VISIT (OUTPATIENT)
Dept: ENDOCRINOLOGY | Facility: CLINIC | Age: 50
End: 2020-11-18
Payer: COMMERCIAL

## 2020-11-18 DIAGNOSIS — E10.65 TYPE 1 DIABETES MELLITUS WITH HYPERGLYCEMIA (H): Primary | ICD-10-CM

## 2020-11-18 PROCEDURE — 99214 OFFICE O/P EST MOD 30 MIN: CPT | Mod: 95 | Performed by: INTERNAL MEDICINE

## 2020-11-18 RX ORDER — GLUCAGON 3 MG/1
3 POWDER NASAL PRN
Qty: 1 EACH | Refills: 3 | Status: SHIPPED | OUTPATIENT
Start: 2020-11-18 | End: 2022-01-10

## 2020-11-18 NOTE — LETTER
"11/18/2020       RE: Schuyler Gallardo  [address] [facility] [address]     Dear Colleague,    Thank you for referring your patient, Schuyler Gallardo, to the [facility] ENDOCRINOLOGY CLINIC [place] [facility]. Please see a copy of my visit note below.    dm 1  Barbara Sullivan CMA    Outcome for 11/18/20 10:34 AM :Glucose sent via Email    Schuyler Gallardo is a 50 year old male who is being evaluated via a billable video visit.      The patient has been notified of following:     \"This video visit will be conducted via a call between you and your physician/provider. We have found that certain health care needs can be provided without the need for an in-person physical exam.  This service lets us provide the care you need with a video conversation.  If a prescription is necessary we can send it directly to your pharmacy.  If lab work is needed we can place an order for that and you can then stop by our lab to have the test done at a later time.    Video visits are billed at different rates depending on your insurance coverage.  Please reach out to your insurance provider with any questions.    If during the course of the call the physician/provider feels a video visit is not appropriate, you will not be charged for this service.\"    Patient has given verbal consent for Video visit? Yes  How would you like to obtain your AVS? Mail a copy  If you are dropped from the video visit, the video invite should be resent to: Send to e-mail at: [email or web address]  Will anyone else be joining your video visit? No      [facility]  Endocrinology  Amir Drew Adler MD  11/18/2020      Chief Complaint:   Diabetes    History of Present Illness:   Schuyler Gallardo is a 50 year old male with a history of type 1 diabetes mellitus and chronic lymphocytic thyroiditis here to follow up.    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  He is overall happy with the " system but looking forward to upgrading to the 780 G system once it is approved.  His recent A1c was 6.8%.  His insulin pump is not set up for data sharing through [employer] so we were not able to review pump and glucose data.  He reports that overall his glucose readings are in good range with no consistent pattern of hyper or hypoglycemia.  He feels that the 670 G system auto mode is not aggressive in lowering glucose readings.  He can feel symptoms of low blood sugar and denies any severe hypoglycemia episode.    He exercises 3-4 times per week.  Overall he feels well and denies any acute complaints today.  Denies any symptoms of chest pain or pressure, excessive shortness of breath, nausea vomiting or diarrhea, early satiety, tingling or numbness in fingers or toes.  He monitors blood pressure at home and reports that readings are in range.  He reports that he has lost a few pounds over the last several months and his current weight is around 159 pounds.  Since the last visit he had increased the dose of Crestor to 10 mg daily and is tolerating it without any muscle symptoms.  In the past he has had some muscle related symptoms with exercise with statins but does have resolved.  Per patient report his recent LDL was 78.    Blood Glucose Monitoring:    We did not have glucose data to review today    Current Insulin Pump Settings:  Type of Pump: [employer] Minimed: Model 670G-pump settings are from previous note  Current Settings:   BASAL RATES and times:  12   AM (midnight): 0.75 units/hour    1     AM: 0.7 units/hour   6     AM: 0.55 units/hour   10:30  AM: 0.45 units/hour   11:30   PM: 0.65 units/hour    CARB RATIO and times:  12   AM (midnight): 12   Corection Factor (Sensitivity) and times:  12   AM (midnight): 40 mg/dL   BLOOD GLUCOSE TARGET and times:  12   AM (midnight): 90 - 125       Amount of Time Insulin is Active:  3 hrs    Diabetes monitoring and complications:  CAD: Yes  Last eye exam results:  5/21/2019. No DR  Microalbuminuria: negative   Neuropathy: No  HTN: No  On Statin: Yes  On Aspirin: Yes  Depression: Yes  Erectile dysfunction: No    Patient Supplied Answers To Diabetic Questionnaire  No flowsheet data found.     Review of Systems:   Pertinent items are noted in HPI.  All other systems are negative.    Active Medications:     Current Outpatient Medications:      aspirin 81 MG tablet, Take 1 tablet (81 mg) by mouth daily, Disp: 30 tablet, Rfl:      blood glucose monitoring (VIANNEY CONTOUR NEXT) test strip, Test 4-5 times daily / OR AS DIRECTED, Disp: 450 each, Rfl: 3     Continuous Blood Gluc Sensor ([employer] GUARDIAN SENSOR 3) MISC, 1 each once a week Change every 5 days, Disp: 10 each, Rfl: 11     insulin lispro (HUMALOG) 100 UNIT/ML vial, Use in pump basal rate plus meal coverage approx 50-60 units daily, Disp: 60 mL, Rfl: 3     Insulin Syringes, Disposable, U-100 0.5 ML MISC, , Disp: , Rfl:      levothyroxine (SYNTHROID/LEVOTHROID) 112 MCG tablet, Take 1 tablet (112 mcg) by mouth daily, Disp: 90 tablet, Rfl: 3     Multiple Vitamin (MULTIVITAMINS PO), , Disp: , Rfl:      rosuvastatin (CRESTOR) 10 MG tablet, Take 1 tablet (10 mg) by mouth daily, Disp: 90 tablet, Rfl: 3      Allergies:   Patient has no known allergies.      Past Medical History:  Type 1 diabetes mellitus  Dyslipidemia  Chronic lymphocytic thyroiditis  Coronary artery disease    Family History:   Paternal grandmother: macular degeneration      Social History:     Non-smoker     Physical Exam:   There were no vitals taken for this visit.     Constitutional: no distress, comfortable, pleasant   Psychological: appropriate mood, good eye contact, normal affect     Data:  Recent labs reviewed in care everywhere    Assessment and Plan:  Type 1 diabetes mellitus (H)  Overall good control with A1c of 6.8%  Set up pump for data sharing through [employer]  Schedule eye exam in the coming months   Intranasal glucagon emergency kit  prescribed    Dyslipidemia  LDL in range and tolerating current dose of Crestor    Hypothyroidism   Recent TSH was in range continue current dose of levothyroxine    Follow-up: Return to clinic in 6 months to 1 year    Note: Chart documentation done in part with Dragon Voice Recognition software. Although reviewed after completion, some word and grammatical errors may remain.  Please consider this when interpreting information in this chart    CHARLES Payne    Video-Visit Details    Type of service:  Video Visit    Total video visit time 22 minutes    Originating Location (pt. Location): Home    Distant Location (provider location):  [facility] ENDOCRINOLOGY CLINIC [place]     Platform used for Video Visit: [email or web address]

## 2020-11-19 DIAGNOSIS — E10.65 TYPE 1 DIABETES MELLITUS WITH HYPERGLYCEMIA (H): Primary | ICD-10-CM

## 2020-11-22 ENCOUNTER — MEDICAL CORRESPONDENCE (OUTPATIENT)
Dept: HEALTH INFORMATION MANAGEMENT | Facility: CLINIC | Age: 50
End: 2020-11-22

## 2020-11-23 ENCOUNTER — DOCUMENTATION ONLY (OUTPATIENT)
Dept: ENDOCRINOLOGY | Facility: CLINIC | Age: 50
End: 2020-11-23

## 2020-12-21 DIAGNOSIS — E78.5 DYSLIPIDEMIA: ICD-10-CM

## 2020-12-21 DIAGNOSIS — E55.9 VITAMIN D DEFICIENCY: ICD-10-CM

## 2020-12-21 DIAGNOSIS — E03.9 HYPOTHYROIDISM, UNSPECIFIED TYPE: ICD-10-CM

## 2020-12-21 DIAGNOSIS — E10.65 TYPE 1 DIABETES MELLITUS WITH HYPERGLYCEMIA (H): ICD-10-CM

## 2020-12-24 RX ORDER — LEVOTHYROXINE SODIUM 112 UG/1
112 TABLET ORAL DAILY
Qty: 90 TABLET | Refills: 0 | Status: SHIPPED | OUTPATIENT
Start: 2020-12-24 | End: 2021-03-24

## 2020-12-24 NOTE — TELEPHONE ENCOUNTER
levothyroxine 112 mcg tablet (SYNTHROID)    Last Written Prescription Date:  11/21/2019  Last Fill Quantity: 90,   # refills: 3  Last Office Visit : 11/18/2020  Future Office visit:  [facility]  90 Tabs, sent to pharm 12/24/2020      Radah Norton RN  Central Triage Red Flags/Med Refills

## 2021-01-21 DIAGNOSIS — E10.65 TYPE 1 DIABETES MELLITUS WITH HYPERGLYCEMIA (H): Primary | ICD-10-CM

## 2021-01-21 RX ORDER — LANCETS
EACH MISCELLANEOUS
Qty: 400 EACH | Refills: 4 | Status: SHIPPED | OUTPATIENT
Start: 2021-01-21

## 2021-01-21 RX ORDER — INSULIN PUMP SYRINGE, 3 ML
EACH MISCELLANEOUS
Qty: 45 EACH | Refills: 4 | Status: SHIPPED | OUTPATIENT
Start: 2021-01-21 | End: 2022-01-10

## 2021-01-21 RX ORDER — INFUSION SET FOR INSULIN PUMP
INFUSION SETS-PARAPHERNALIA MISCELLANEOUS
Qty: 4 BOX | Refills: 4 | Status: SHIPPED | OUTPATIENT
Start: 2021-01-21 | End: 2022-01-10

## 2021-02-01 DIAGNOSIS — E10.65 TYPE 1 DIABETES MELLITUS WITH HYPERGLYCEMIA (H): Primary | ICD-10-CM

## 2021-02-01 RX ORDER — LANCETS
EACH MISCELLANEOUS
Qty: 450 EACH | Refills: 3 | Status: SHIPPED | OUTPATIENT
Start: 2021-02-01 | End: 2022-01-10

## 2021-02-10 ENCOUNTER — TELEPHONE (OUTPATIENT)
Dept: ENDOCRINOLOGY | Facility: CLINIC | Age: 51
End: 2021-02-10

## 2021-02-10 NOTE — TELEPHONE ENCOUNTER
[facility] Call Center    Phone Message    May a detailed message be left on voicemail: yes     Reason for Call:     Malu from [employer] asking if clinic received request for last visit chart notes and sensors. It was faxed on 02/04/21. Call back # [phone]    Action Taken: Message routed to:  [facility] endo    Travel Screening: Not Applicable

## 2021-02-16 NOTE — TELEPHONE ENCOUNTER
[facility]    Phone Message    May a detailed message be left on voicemail: yes     Reason for Call: Other: Belén is calling from [employer] about the status of the  of the request for last visit chart notes and sensors. It was faxed on 02/04/21. Call back # [phone]     Action Taken: Message routed to:  [facility] Endo    Travel Screening: Not Applicable

## 2021-02-23 ENCOUNTER — TELEPHONE (OUTPATIENT)
Dept: ENDOCRINOLOGY | Facility: CLINIC | Age: 51
End: 2021-02-23

## 2021-02-23 NOTE — TELEPHONE ENCOUNTER
[facility]    Phone Message    May a detailed message be left on voicemail: no     Reason for Call: Pt's wife Tricia is calling asking to speak to the care team about pt's gluclose sensor.  She said when the nurse calls her back, she will get into more detail with her questions.    Action Taken: Message routed to:  [facility] Endo    Travel Screening: Not Applicable

## 2021-02-24 ENCOUNTER — TELEPHONE (OUTPATIENT)
Dept: ENDOCRINOLOGY | Facility: CLINIC | Age: 51
End: 2021-02-24

## 2021-02-24 NOTE — TELEPHONE ENCOUNTER
[facility]    Phone Message    May a detailed message be left on voicemail: yes     Reason for Call: Other: Veronique from [employer] is calling back to  on message below. Please call ASAP. Thank you.      Action Taken: Message routed to:  [facility] Endo    Travel Screening: Not Applicable

## 2021-02-24 NOTE — TELEPHONE ENCOUNTER
[facility]    Phone Message    May a detailed message be left on voicemail: no     Reason for Call: Other: [facility] medical requesting call back to verify if a detailed written order for diabetic supplies was received by the clinic it was sent on 2/4/21     Action Taken: Message routed to:  [facility] lisha

## 2021-02-24 NOTE — TELEPHONE ENCOUNTER
[facility] Call [facility]    Phone Message    May a detailed message be left on voicemail: yes     Reason for Call: Form or Letter   Type or form/letter needing completion: Faxed forms from [employer]  Provider: Dr. Adler  Date form needed: ASAP  Once completed: Fax form to: [phone]  [place] Phone: [phone]    Tricia would like a call back to confirm these forms have been faxed to [employer].  Please call.        Forms regarding reservoirs and infusion kit supplies.    Action Taken: Message routed to:  [facility] Endo    Travel Screening: Not Applicable

## 2021-02-25 ENCOUNTER — MEDICAL CORRESPONDENCE (OUTPATIENT)
Dept: HEALTH INFORMATION MANAGEMENT | Facility: CLINIC | Age: 51
End: 2021-02-25

## 2021-03-21 DIAGNOSIS — E03.9 HYPOTHYROIDISM, UNSPECIFIED TYPE: ICD-10-CM

## 2021-03-21 DIAGNOSIS — E10.65 TYPE 1 DIABETES MELLITUS WITH HYPERGLYCEMIA (H): ICD-10-CM

## 2021-03-21 DIAGNOSIS — E55.9 VITAMIN D DEFICIENCY: ICD-10-CM

## 2021-03-21 DIAGNOSIS — E78.5 DYSLIPIDEMIA: ICD-10-CM

## 2021-03-24 RX ORDER — LEVOTHYROXINE SODIUM 112 UG/1
112 TABLET ORAL DAILY
Qty: 90 TABLET | Refills: 1 | Status: SHIPPED | OUTPATIENT
Start: 2021-03-24 | End: 2021-09-29

## 2021-03-24 NOTE — TELEPHONE ENCOUNTER
levothyroxine (SYNTHROID/LEVOTHROID) 112 MCG tablet      Last Written Prescription Date:  12/24/2020  Last Fill Quantity: 90,   # refills: 0  Last Office Visit : 11/18/2020  Future Office visit:  None    Routing refill request to provider for review/approval because:  Failed Endocrine medication protocol: lab past due  - TSH    TSH   Date Value Ref Range Status   10/15/2019 2.39 0.40 - 4.00 mU/L Final

## 2021-03-28 DIAGNOSIS — E55.9 VITAMIN D DEFICIENCY: ICD-10-CM

## 2021-03-28 DIAGNOSIS — E03.9 HYPOTHYROIDISM, UNSPECIFIED TYPE: ICD-10-CM

## 2021-03-28 DIAGNOSIS — E10.65 TYPE 1 DIABETES MELLITUS WITH HYPERGLYCEMIA (H): ICD-10-CM

## 2021-03-28 DIAGNOSIS — E78.5 DYSLIPIDEMIA: ICD-10-CM

## 2021-09-28 DIAGNOSIS — E03.9 HYPOTHYROIDISM, UNSPECIFIED TYPE: ICD-10-CM

## 2021-09-28 DIAGNOSIS — E78.5 DYSLIPIDEMIA: ICD-10-CM

## 2021-09-28 DIAGNOSIS — E10.65 TYPE 1 DIABETES MELLITUS WITH HYPERGLYCEMIA (H): ICD-10-CM

## 2021-09-28 DIAGNOSIS — E55.9 VITAMIN D DEFICIENCY: ICD-10-CM

## 2021-09-29 RX ORDER — LEVOTHYROXINE SODIUM 112 UG/1
112 TABLET ORAL DAILY
Qty: 90 TABLET | Refills: 1 | Status: SHIPPED | OUTPATIENT
Start: 2021-09-29 | End: 2022-01-10

## 2021-09-29 NOTE — TELEPHONE ENCOUNTER
levothyroxine (SYNTHROID/LEVOTHROID) 112 MCG tablet   Take 1 tablet (112 mcg) by mouth daily      Last Written Prescription Date:  3/24/21  Last Fill Quantity: 90,   # refills: 1  Last Office Visit : 11/18/20  Follow-up: Return to clinic in 6 months to 1 year  Future Office visit:  none    Routing refill request to provider for review/approval because:  Overdue TSH. Lab order in que.

## 2021-10-05 ENCOUNTER — TELEPHONE (OUTPATIENT)
Dept: ENDOCRINOLOGY | Facility: CLINIC | Age: 51
End: 2021-10-05

## 2021-10-05 NOTE — TELEPHONE ENCOUNTER
Action Needed --  I've placed a hold that needs scheduling. Please see below.  Department: Endocrine  Provider: Nayely RTN   Date/Time: 12/21/21 2 PM  30 min FTF  RFV: Type 1 diabetes  Referring Provider: salena Duggan RN on 10/5/2021 at 9:05 AM

## 2021-10-05 NOTE — TELEPHONE ENCOUNTER
I spoke to  Tricia and He is scheduled for FTF  12/21/21 2 PM RTN for Diabetes with Dr Adler. Libertad Duggan RN on 10/5/2021 at 9:06 AM

## 2021-10-05 NOTE — TELEPHONE ENCOUNTER
[facility]    Phone Message    May a detailed message be left on voicemail: yes     Reason for Call: Other: Wife is calling on behalf of her  , Dr. Gallardo would like to connect with care team  Regarding husbands scheduling I understand that we do not reach out about appointment request she insisted that I send a message over He is off Dec16-23rd and would like something then.     Action Taken: Other: Endo    Travel Screening: Not Applicable

## 2022-01-07 NOTE — PROGRESS NOTES
Outcome for 01/07/22 11:03 AM: Left Voicemail for blood glucose data Fallon Trivedi   Virtual Visit Facilitator  Outcome for 01/07/22 3:31 PM: Left Voicemail    Outcome for 01/10/22 10:15 AM: Data emailed to [facility]  Endocrinology  Amicitlaly Adler MD  01/10/2022      Chief Complaint:   Diabetes    History of Present Illness:   Schuyler Gallardo is a 51 year old male with a history of type 1 diabetes mellitus and chronic lymphocytic thyroiditis.    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    CGM today was reviewed.  Over the last 2 weeks average sensor glucose is 141 with standard deviation of 46.  Auto mode use was 68% during this time.  Time in range 78%, 16% high, 2% very high, 2% low and 2% very low.  Total daily insulin use was 27 with 56% bolus.  Average carb entered per day during this time was 127 with standard deviation 96 g  Some of the lows tend to occur after a manual correction bolus.  Patient feels that the Medtronic auto bolus system is not aggressive and he has to take manual bolus otherwise it takes too long for the blood sugar to come down.   He can feel symptoms of low blood sugar and denies any severe hypoglycemia episode.    He exercises regularly.  Overall he feels well and denies any acute complaints today.  Denies any symptoms of chest pain or pressure, excessive shortness of breath, tingling or numbness in fingers or toes.  He monitors blood pressure at home and reports that systolic readings range between 120-135.  He reports that his weight is stable and recent weight was 161 pounds  He is currently on Crestor 10 mg daily.  Higher doses of Crestor leads to myalgia  Last routine labs were done more than 1 year ago.    Blood Glucose Monitoring:  Noted above    Current Insulin Pump Settings:  Type of Pump: Medtronic Minimed: Model 670G-pump settings updated today  Current Settings:   BASAL RATES and times:  12   AM (midnight): 0.725 units/hour    1  "    AM: 0.625 units/hour   10:30  AM: 0.475 units/hour   5:30   PM: 0.625 units/hour    CARB RATIO and times:  12   AM (midnight): 15   Corection Factor (Sensitivity) and times:  12   AM (midnight): 35 mg/dL  4 AM   40   BLOOD GLUCOSE TARGET and times:  12   AM (midnight): 90 - 110       Amount of Time Insulin is Active:  3 hrs    Diabetes monitoring and complications:  CAD: Yes- reports having a high coronary calcium score and coronary artery disease without obstruction on screening cardiac testing.  Last eye exam results: 5/21/2019. No DR  Microalbuminuria: negative   Neuropathy: No  HTN: No  On Statin: Yes  On Aspirin: Yes  Depression: Yes  Erectile dysfunction: No    Patient Supplied Answers To Diabetic Questionnaire  No flowsheet data found.     Review of Systems:   Pertinent items are noted in HPI.  All other systems are negative.    Active Medications:     Current Outpatient Medications:      aspirin 81 MG tablet, Take 1 tablet (81 mg) by mouth daily, Disp: 30 tablet, Rfl:      blood glucose (NO BRAND SPECIFIED) test strip, Use to test blood sugar 5 times daily or as directed. ACCU CHECK GUIDE STRIPS, Disp: 450 strip, Rfl: 3     blood glucose (NO BRAND SPECIFIED) test strip, Use to test blood sugar 4 times daily or as directed.  Accu-chek guide needed per pump requirements., Disp: 400 strip, Rfl: 4     blood glucose monitoring (ACCU-CHEK FASTCLIX) lancets, Use to test blood sugar 5 times daily or as directed., Disp: 450 each, Rfl: 3     Continuous Blood Gluc Sensor (MINIMED GUARDIAN SENSOR 3) MISC, 1 each once a week Change every 5 days, Disp: 10 each, Rfl: 11     Glucagon (BAQSIMI TWO PACK) 3 MG/DOSE POWD, Spray 3 mg in nostril as needed (for severe hypoglycemia), Disp: 1 each, Rfl: 3     infusion set (BIJAN 23\" 6MM) [facility] pump supply, Infusion set to be used with pump.  Change every 2-3 days as directed., Disp: 4 Box, Rfl: 4     Insulin Infusion Pump Supplies (MINIMED PUMP RESERVOIR 3ML) [facility], Change every 2-3 " days., Disp: 45 each, Rfl: 4     insulin lispro (HUMALOG) 100 UNIT/ML vial, Use in pump basal rate plus meal coverage approx 50-60 units daily, Disp: 60 mL, Rfl: 3     Insulin Pump Accessories (MINIMED GUARDIAN LINK 3) MISC, 1 each See Admin Instructions Use per 's instructions., Disp: 1 each, Rfl: 3     Insulin Pump Accessories MISC, [employer] infusion sets, Disp: 10 each, Rfl: 3     insulin syringes, disposable, U-100 0.5 ML MISC, Use 4 times daily as needed in case of insulin pump failure, Disp: 100 each, Rfl: 1     levothyroxine (SYNTHROID/LEVOTHROID) 112 MCG tablet, Take 1 tablet (112 mcg) by mouth daily, Disp: 90 tablet, Rfl: 1     Multiple Vitamin (MULTIVITAMINS PO), , Disp: , Rfl:      rosuvastatin (CRESTOR) 10 MG tablet, Take 1 tablet (10 mg) by mouth daily, Disp: 90 tablet, Rfl: 3     thin (NO BRAND SPECIFIED) lancets, 4x daily. Use with lanceting device. Any covered brand., Disp: 400 each, Rfl: 4     blood glucose monitoring (VIANNEY CONTOUR NEXT) test strip, Test 4-5 times daily / OR AS DIRECTED, Disp: 450 each, Rfl: 3      Allergies:   Patient has no known allergies.      Past Medical History:  Type 1 diabetes mellitus  Dyslipidemia  Chronic lymphocytic thyroiditis  Coronary artery disease    Family History:   Paternal grandmother: macular degeneration      Social History:     Non-smoker     Physical Exam:   There were no vitals taken for this visit.     GENERAL: Healthy, alert and no distress  EYES: Eyes grossly normal to inspection.    RESP: No audible wheeze, cough, or visible cyanosis.  No visible retractions or increased work of breathing.    NEURO:Mentation and speech appropriate for age.  PSYCH: affect normal/bright, judgement and insight intact, normal speech and appearance well-groomed.    Data:  Recent labs reviewed in care everywhere    Assessment and Plan:  Type 1 diabetes mellitus (H)  Overall good control based on recent continuous glucose monitor data.  He would like to  upgrade pump/CGM as soon as the new Medtronic system is approved  Occasional moderate hypoglycemia but he can easily anticipate/recognize and treat   Prescriptions refilled  He is due for routine labs and would do these in the coming weeks.  Also due for eye exam    Dyslipidemia  On statin, check lipids    Hypothyroidism   Has been on a stable dose of levothyroxine, check TSH    Follow-up: Return to clinic in 6 months to 1 year    Orders Placed This Encounter   Procedures     Hemoglobin A1c     Albumin Random Urine Quantitative with Creat Ratio     Creatinine     Lipid Profile     TSH     Urea nitrogen     Potassium     Electrolyte panel     CBC with platelets       Note: Chart documentation done in part with Dragon Voice Recognition software. Although reviewed after completion, some word and grammatical errors may remain.  Please consider this when interpreting information in this chart    CHARLES Payne    Video-Visit Details    Type of service:  Video Visit    Video visit start time 10:57 AM, video visit end time 11:13 AM    Originating Location (pt. Location): [place]    Distant Location (provider location):  [facility] ENDOCRINOLOGY [facility]     Platform used for Video Visit: AmWell     Time: I spent 30 minutes on the date of the encounter preparing to see patient (including chart review and preparation), obtaining and or reviewing additional medical history, performing an evaluation, documenting clinical information in the electronic health record, independently interpreting results, communicating results to the patient, and/or coordinating care.      Answers for HPI/ROS submitted by the patient on 1/10/2022  General Symptoms: No  Skin Symptoms: No  HENT Symptoms: No  EYE SYMPTOMS: No  HEART SYMPTOMS: No  LUNG SYMPTOMS: No  INTESTINAL SYMPTOMS: No  URINARY SYMPTOMS: No  REPRODUCTIVE SYMPTOMS: No  SKELETAL SYMPTOMS: No  BLOOD SYMPTOMS: No  NERVOUS SYSTEM SYMPTOMS: No  MENTAL HEALTH SYMPTOMS:  No

## 2022-01-10 ENCOUNTER — VIRTUAL VISIT (OUTPATIENT)
Dept: ENDOCRINOLOGY | Facility: CLINIC | Age: 52
End: 2022-01-10
Payer: COMMERCIAL

## 2022-01-10 DIAGNOSIS — E55.9 VITAMIN D DEFICIENCY: ICD-10-CM

## 2022-01-10 DIAGNOSIS — E03.9 HYPOTHYROIDISM, UNSPECIFIED TYPE: ICD-10-CM

## 2022-01-10 DIAGNOSIS — E10.65 TYPE 1 DIABETES MELLITUS WITH HYPERGLYCEMIA (H): ICD-10-CM

## 2022-01-10 DIAGNOSIS — E78.5 DYSLIPIDEMIA: ICD-10-CM

## 2022-01-10 PROCEDURE — 99214 OFFICE O/P EST MOD 30 MIN: CPT | Mod: 95 | Performed by: INTERNAL MEDICINE

## 2022-01-10 RX ORDER — ROSUVASTATIN CALCIUM 10 MG/1
10 TABLET, COATED ORAL DAILY
Qty: 90 TABLET | Refills: 3 | Status: SHIPPED | OUTPATIENT
Start: 2022-01-10 | End: 2023-02-01

## 2022-01-10 RX ORDER — LEVOTHYROXINE SODIUM 112 UG/1
112 TABLET ORAL DAILY
Qty: 90 TABLET | Refills: 1 | Status: SHIPPED | OUTPATIENT
Start: 2022-01-10 | End: 2022-09-28

## 2022-01-10 RX ORDER — INFUSION SET FOR INSULIN PUMP
INFUSION SETS-PARAPHERNALIA MISCELLANEOUS
Qty: 4 BOX | Refills: 4 | Status: SHIPPED | OUTPATIENT
Start: 2022-01-10 | End: 2023-08-03

## 2022-01-10 RX ORDER — GLUCAGON 3 MG/1
3 POWDER NASAL
Qty: 1 EACH | Refills: 3 | Status: SHIPPED | OUTPATIENT
Start: 2022-01-10

## 2022-01-10 RX ORDER — INSULIN PUMP SYRINGE, 3 ML
EACH MISCELLANEOUS
Qty: 45 EACH | Refills: 4 | Status: SHIPPED | OUTPATIENT
Start: 2022-01-10 | End: 2023-02-01

## 2022-01-10 RX ORDER — LANCETS
EACH MISCELLANEOUS
Qty: 450 EACH | Refills: 3 | Status: SHIPPED | OUTPATIENT
Start: 2022-01-10 | End: 2023-02-01

## 2022-01-10 NOTE — LETTER
Date:January 12, 2022      Patient was self referred, no letter generated. Do not send.        [facility] Health Information

## 2022-01-10 NOTE — PROGRESS NOTES
Bull is a 51 year old who is being evaluated via a billable video visit.      How would you like to obtain your AVS? MyChart  If the video visit is dropped, the invitation should be resent by: Send to e-mail at: [email or web address]  Will anyone else be joining your video visit? No

## 2022-01-10 NOTE — NURSING NOTE
Patient denies any changes since echeck-in regarding medication and allergies and states all information entered during echeck-in remains accurate.  Ute Jackson, CMA

## 2022-01-11 ENCOUNTER — TELEPHONE (OUTPATIENT)
Dept: ENDOCRINOLOGY | Facility: CLINIC | Age: 52
End: 2022-01-11
Payer: COMMERCIAL

## 2022-01-11 DIAGNOSIS — E10.65 TYPE 1 DIABETES MELLITUS WITH HYPERGLYCEMIA (H): Primary | ICD-10-CM

## 2022-01-11 RX ORDER — BLOOD-GLUCOSE SENSOR
1 EACH MISCELLANEOUS
Qty: 12 EACH | Refills: 3 | Status: SHIPPED | OUTPATIENT
Start: 2022-01-11 | End: 2023-01-25

## 2022-01-11 RX ORDER — BLOOD-GLUCOSE SENSOR
1 EACH MISCELLANEOUS
Qty: 5 EACH | Refills: 11 | Status: SHIPPED | OUTPATIENT
Start: 2022-01-11 | End: 2023-08-03

## 2022-01-11 NOTE — TELEPHONE ENCOUNTER
LVM to please provide contact information of lab Pt would like to use.   Jaymie Rojas RN on 1/11/2022 at 9:49 AM

## 2022-01-11 NOTE — TELEPHONE ENCOUNTER
----- Message from Libertad Duggan RN sent at 1/10/2022 11:48 AM CST -----    ----- Message -----  From: Boyd Adler MD  Sent: 1/10/2022  11:26 AM CST  To: [facility] Endo Triage-[facility]     I have ordered future labs but patient would like to do these at [facility].  Could we send the lab orders to his preferred location    Thank you

## 2022-01-13 ENCOUNTER — TELEPHONE (OUTPATIENT)
Dept: ENDOCRINOLOGY | Facility: CLINIC | Age: 52
End: 2022-01-13
Payer: COMMERCIAL

## 2022-01-13 NOTE — TELEPHONE ENCOUNTER
SOPHYM with request for local lab contact information.   Jaymie Rojas RN on 1/13/2022 at 1:29 PM

## 2022-01-13 NOTE — TELEPHONE ENCOUNTER
----- Message from Libertad Duggan RN sent at 1/10/2022 11:48 AM CST -----    ----- Message -----  From: Boyd Adler MD  Sent: 1/10/2022  11:26 AM CST  To: [facility] Triage-[facility]     I have ordered future labs but patient would like to do these at [facility].  Could we send the lab orders to his preferred location    Thank you

## 2022-01-21 ENCOUNTER — TELEPHONE (OUTPATIENT)
Dept: ENDOCRINOLOGY | Facility: CLINIC | Age: 52
End: 2022-01-21
Payer: COMMERCIAL

## 2022-01-21 NOTE — LETTER
"[facility] ENDOCRINOLOGY CLINIC [address] [address]  [phone]  Endocrine Clinic phone     Fax   FACSIMILE TRANSMITTAL sent 21 JAN 2022 at 10:44AM    TO:  LAB  COMPANY:  [facility]  FAX NUMBER:   PHONE NUMBER:      FROM:   PHONE:   DATE: 01/21/22  NUMBER OF PAGES:     _____URGENT _____REVIEW ONLY _____PLEASE COMMENT____PLEASE REPLY    NOTES/COMMENTS: Please forward results via CareEverywhere/Fax  Attn: Dr Boyd Adler  RE: Schuyler Gallardo \"Bull\"  Male, 51 year old, 1970  MRN: [id number]           IF YOU DID NOT RECEIVE THE CORRECT NUMBER OF PAGES OR THE FAX DID NOT COME THROUGH CLEARLY, PLEASE CALL THE SENDER     CONFIDENTIALITY STATEMENT: Confidential information that may accompany this transmission contains protected health information under state and federal law and is legally privileged. This information is intended only for the use of the individual or entity named above and may be used only for carrying out treatment, payment or other healthcare operations. The recipient or person responsible for delivering this information is prohibited by law from disclosing this information without proper authorization to any other party, unless required to do so by law or regulation. If you are not the intended recipient, you are hereby notified that any review, dissemination, distribution, or copying of this message is strictly prohibited. If you have received this communication in error, please destroy the materials and contact us immediately by calling the number listed above. No response indicates that the information was received by the appropriate authorized party   "

## 2022-01-21 NOTE — TELEPHONE ENCOUNTER
LVM that we have faxed orders to [facility] with number to call to schedule lab draw. .   Jaymie Rojas, RN on 1/21/2022 at 10:40 AM   Lab orders faxed to:    [facility] Phone [phone]  Lab fax: [phone]      RE      DM    1/13/22 1:23 PM  Note     ----- Message from Libertad Duggan RN sent at 1/10/2022 11:48 AM CST -----     ----- Message -----  From: Boyd Adler MD  Sent: 1/10/2022  11:26 AM CST  To: [facility] Endo Triage-     Hi      I have ordered future labs but patient would like to do these at [facility].  Could we send the lab orders to his preferred location     Thank you

## 2022-03-05 ENCOUNTER — HEALTH MAINTENANCE LETTER (OUTPATIENT)
Age: 52
End: 2022-03-05

## 2022-07-26 ENCOUNTER — TRANSFERRED RECORDS (OUTPATIENT)
Dept: HEALTH INFORMATION MANAGEMENT | Facility: CLINIC | Age: 52
End: 2022-07-26

## 2022-07-26 LAB — RETINOPATHY: NEGATIVE

## 2022-09-26 DIAGNOSIS — E10.65 TYPE 1 DIABETES MELLITUS WITH HYPERGLYCEMIA (H): ICD-10-CM

## 2022-09-26 DIAGNOSIS — E78.5 DYSLIPIDEMIA: ICD-10-CM

## 2022-09-26 DIAGNOSIS — E03.9 HYPOTHYROIDISM, UNSPECIFIED TYPE: ICD-10-CM

## 2022-09-26 DIAGNOSIS — E55.9 VITAMIN D DEFICIENCY: ICD-10-CM

## 2022-09-28 RX ORDER — LEVOTHYROXINE SODIUM 112 UG/1
112 TABLET ORAL DAILY
Qty: 90 TABLET | Refills: 1 | Status: SHIPPED | OUTPATIENT
Start: 2022-09-28 | End: 2023-01-25

## 2022-09-28 NOTE — TELEPHONE ENCOUNTER
levothyroxine 112 mcg tablet (SYNTHROID)      Last Written Prescription Date:  1/10/22  Last Fill Quantity: 90,   # refills: 1  Last Office Visit : 1/10/22 recommended 6-12 month follow up  Future Office visit:  None scheduled    Routing refill request to provider for review/approval because:  Most recent TSH care everywhere 11/16/20  TSH 0.35 - 4.94 uIU/mL 0.46

## 2022-11-20 ENCOUNTER — HEALTH MAINTENANCE LETTER (OUTPATIENT)
Age: 52
End: 2022-11-20

## 2022-11-23 ENCOUNTER — TELEPHONE (OUTPATIENT)
Dept: ENDOCRINOLOGY | Facility: CLINIC | Age: 52
End: 2022-11-23

## 2022-11-23 NOTE — TELEPHONE ENCOUNTER
[facility] Call [facility]    Phone Message    May a detailed message be left on voicemail: yes     Reason for Call: Other: Patient's wife called to ask if Dr. Adler is able to reschedule her 's 2/22/23 appointment to the week of 1/23/22. Please follow up. Thank you.    Action Taken: Other: Endo    Travel Screening: Not Applicable

## 2022-12-01 NOTE — TELEPHONE ENCOUNTER
Left Voicemail (1st Attempt) for the patient to call back and schedule the following:    Appointment type: Return Endo  Provider: Dr. Adler  Return date: 1/23/2023  Specialty phone number: [phone]  Additional appointment(s) needed: yes  Additonal Notes: also sent a [employer] message.    The only time is a ANATOLIY time slot on 1/25/2023 at 12:30 p.m., with labs prior.     Ok per Amy DALE to schedule patient in clinic.    Samira R/Procedure    [facility]   Neurology, NeuroSurgery, NeuroPsychology and Pain Management Specialties  Medical/Surgical Adult Specialties

## 2023-01-02 ENCOUNTER — MEDICAL CORRESPONDENCE (OUTPATIENT)
Dept: HEALTH INFORMATION MANAGEMENT | Facility: CLINIC | Age: 53
End: 2023-01-02
Payer: COMMERCIAL

## 2023-01-02 ENCOUNTER — DOCUMENTATION ONLY (OUTPATIENT)
Dept: ENDOCRINOLOGY | Facility: CLINIC | Age: 53
End: 2023-01-02

## 2023-01-25 ENCOUNTER — LAB (OUTPATIENT)
Dept: LAB | Facility: CLINIC | Age: 53
End: 2023-01-25
Payer: COMMERCIAL

## 2023-01-25 ENCOUNTER — OFFICE VISIT (OUTPATIENT)
Dept: ENDOCRINOLOGY | Facility: CLINIC | Age: 53
End: 2023-01-25
Payer: COMMERCIAL

## 2023-01-25 VITALS
HEIGHT: 70 IN | DIASTOLIC BLOOD PRESSURE: 73 MMHG | BODY MASS INDEX: 23.84 KG/M2 | HEART RATE: 73 BPM | SYSTOLIC BLOOD PRESSURE: 154 MMHG | WEIGHT: 166.5 LBS | OXYGEN SATURATION: 99 %

## 2023-01-25 DIAGNOSIS — E10.65 TYPE 1 DIABETES MELLITUS WITH HYPERGLYCEMIA (H): ICD-10-CM

## 2023-01-25 DIAGNOSIS — E55.9 VITAMIN D DEFICIENCY: ICD-10-CM

## 2023-01-25 DIAGNOSIS — E78.5 DYSLIPIDEMIA: ICD-10-CM

## 2023-01-25 DIAGNOSIS — E03.9 HYPOTHYROIDISM, UNSPECIFIED TYPE: ICD-10-CM

## 2023-01-25 LAB
ANION GAP SERPL CALCULATED.3IONS-SCNC: 4 MMOL/L (ref 3–14)
BUN SERPL-MCNC: 10 MG/DL (ref 7–30)
CHLORIDE BLD-SCNC: 105 MMOL/L (ref 94–109)
CHOLEST SERPL-MCNC: 135 MG/DL
CO2 SERPL-SCNC: 30 MMOL/L (ref 20–32)
CREAT SERPL-MCNC: 0.94 MG/DL (ref 0.66–1.25)
CREAT UR-MCNC: 50 MG/DL
ERYTHROCYTE [DISTWIDTH] IN BLOOD BY AUTOMATED COUNT: 11.8 % (ref 10–15)
FASTING STATUS PATIENT QL REPORTED: NORMAL
GFR SERPL CREATININE-BSD FRML MDRD: >90 ML/MIN/1.73M2
HBA1C MFR BLD: 6.6 % (ref 0–5.6)
HCT VFR BLD AUTO: 47.7 % (ref 40–53)
HDLC SERPL-MCNC: 59 MG/DL
HGB BLD-MCNC: 16.1 G/DL (ref 13.3–17.7)
LDLC SERPL CALC-MCNC: 61 MG/DL
MCH RBC QN AUTO: 30.7 PG (ref 26.5–33)
MCHC RBC AUTO-ENTMCNC: 33.8 G/DL (ref 31.5–36.5)
MCV RBC AUTO: 91 FL (ref 78–100)
MICROALBUMIN UR-MCNC: <5 MG/L
MICROALBUMIN/CREAT UR: NORMAL MG/G{CREAT}
NONHDLC SERPL-MCNC: 76 MG/DL
PLATELET # BLD AUTO: 201 10E3/UL (ref 150–450)
POTASSIUM BLD-SCNC: 4.2 MMOL/L (ref 3.4–5.3)
POTASSIUM BLD-SCNC: 4.2 MMOL/L (ref 3.4–5.3)
RBC # BLD AUTO: 5.25 10E6/UL (ref 4.4–5.9)
SODIUM SERPL-SCNC: 139 MMOL/L (ref 133–144)
TRIGL SERPL-MCNC: 75 MG/DL
TSH SERPL DL<=0.005 MIU/L-ACNC: 1.2 MU/L (ref 0.4–4)
WBC # BLD AUTO: 5 10E3/UL (ref 4–11)

## 2023-01-25 PROCEDURE — 84443 ASSAY THYROID STIM HORMONE: CPT

## 2023-01-25 PROCEDURE — 80061 LIPID PANEL: CPT

## 2023-01-25 PROCEDURE — 82565 ASSAY OF CREATININE: CPT

## 2023-01-25 PROCEDURE — 36415 COLL VENOUS BLD VENIPUNCTURE: CPT

## 2023-01-25 PROCEDURE — 99214 OFFICE O/P EST MOD 30 MIN: CPT | Performed by: INTERNAL MEDICINE

## 2023-01-25 PROCEDURE — 85027 COMPLETE CBC AUTOMATED: CPT

## 2023-01-25 PROCEDURE — 80051 ELECTROLYTE PANEL: CPT

## 2023-01-25 PROCEDURE — 82570 ASSAY OF URINE CREATININE: CPT

## 2023-01-25 PROCEDURE — 82043 UR ALBUMIN QUANTITATIVE: CPT

## 2023-01-25 PROCEDURE — 84520 ASSAY OF UREA NITROGEN: CPT

## 2023-01-25 PROCEDURE — 83036 HEMOGLOBIN GLYCOSYLATED A1C: CPT

## 2023-01-25 RX ORDER — INFUSION SET FOR INSULIN PUMP
INFUSION SETS-PARAPHERNALIA MISCELLANEOUS
Qty: 4 BOX | Refills: 4 | Status: CANCELLED | OUTPATIENT
Start: 2023-01-25

## 2023-01-25 NOTE — LETTER
1/25/2023         RE: Schuyler Gallardo  [address] [facility] [address]        Dear Colleague,    Thank you for referring your patient, Schuyler Gallardo, to the [facility]. Please see a copy of my visit note below.    Outcome for 01/07/22 11:03 AM: Left Voicemail for blood glucose data Fallon Trivedi   Virtual Visit Facilitator  Outcome for 01/07/22 3:31 PM: Left Voicemail    Outcome for 01/10/22 10:15 AM: Data emailed to provider     [facility]  Endocrinology  Amir Drew Adler MD  01/25/2023      Chief Complaint:   Diabetes    History of Present Illness:   Schuyler Gallardo is a 52  year old male with a history of type 1 diabetes mellitus and chronic lymphocytic thyroiditis.    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    CGM today was reviewed.  Over the last 2 weeks average sensor glucose is 137 standard deviation of 47, time in range 82% low 2%, very low 0%.  Average daily insulin use 28 units, 38% basal    Overall he feels well and denies any acute complaints today.  Denies any symptoms of chest pain or pressure, excessive shortness of breath, tingling or numbness in fingers or toes.    Denies any symptoms concerning for ED  He is currently on Crestor 10 mg daily.  Higher doses of Crestor leads to myalgia    He is interested in trying the extended use Medtronic infusion set as well as trying ultra rapid acting insulin in the Medtronic pump.    Blood Glucose Monitoring:  Noted above    Current Insulin Pump Settings:  Type of Pump: MedPixifly Minimed: Model 670G-pump settings updated today  Current Settings:   BASAL RATES and times:  12   AM (midnight): 0.725 units/hour    1     AM: 0.625 units/hour   10:30  AM: 0.475 units/hour   5:30   PM: 0.625 units/hour    CARB RATIO and times:  12   AM (midnight): 15   Corection Factor (Sensitivity) and times:  12   AM (midnight): 35 mg/dL  4 AM   40   BLOOD GLUCOSE TARGET and times:  12   AM (midnight): 90 - 110  "      Amount of Time Insulin is Active:  2 hrs    Diabetes monitoring and complications:  CAD: Yes- reports having a high coronary calcium score and coronary artery disease without obstruction on screening cardiac testing.  Last eye exam results: 2022. No DR  Microalbuminuria: negative   Neuropathy: No  HTN: No  On Statin: Yes  On Aspirin: Yes  Depression:   Erectile dysfunction: No    Patient Supplied Answers To Diabetic Questionnaire  No flowsheet data found.     Review of Systems:   Pertinent items are noted in HPI.  All other systems are negative.    Active Medications:     Current Outpatient Medications:      aspirin 81 MG tablet, Take 1 tablet (81 mg) by mouth daily, Disp: 30 tablet, Rfl:      blood glucose (NO BRAND SPECIFIED) test strip, Use to test blood sugar 5 times daily or as directed. ACCU CHECK GUIDE STRIPS, Disp: 450 strip, Rfl: 3     blood glucose monitoring (ACCU-CHEK FASTCLIX) lancets, Use to test blood sugar 5 times daily or as directed., Disp: 450 each, Rfl: 3     Continuous Blood Gluc Sensor (GUARDIAN SENSOR 3) MISC, 1 each every 7 days, Disp: 12 each, Rfl: 3     Continuous Blood Gluc Sensor (GUARDIAN SENSOR, 3,) MISC, 1 each every 7 days, Disp: 5 each, Rfl: 11     Continuous Blood Gluc Sensor (MINIMED GUARDIAN SENSOR 3) MISC, 1 each once a week Change every 5 days, Disp: 10 each, Rfl: 11     Continuous Blood Gluc Transmit (MINIMED GUARDIAN LINK 3) MISC, 1 each See Admin Instructions Use per 's instructions., Disp: 1 each, Rfl: 3     Glucagon (BAQSIMI TWO PACK) 3 MG/DOSE POWD, Spray 3 mg in nostril once as needed (for severe hypoglycemia), Disp: 1 each, Rfl: 3     infusion set (BIJAN 23\" 6MM) [facility] pump supply, Infusion set to be used with pump.  Change every 2-3 days as directed., Disp: 4 Box, Rfl: 4     Insulin Infusion Pump Supplies (MINIMED PUMP RESERVOIR 3ML) [facility], Change every 2-3 days., Disp: 45 each, Rfl: 4     insulin lispro (HUMALOG) 100 UNIT/ML vial, Use in pump basal rate " "plus meal coverage approx 50-60 units daily, Disp: 60 mL, Rfl: 3     Insulin Pump Accessories MISC, [place] Advance infusion sets, Disp: 10 each, Rfl: 3     insulin syringes, disposable, U-100 0.5 ML MISC, Use 4 times daily as needed in case of insulin pump failure, Disp: 100 each, Rfl: 1     levothyroxine (SYNTHROID/LEVOTHROID) 112 MCG tablet, Take 1 tablet (112 mcg) by mouth daily For additional refills, please schedule a follow-up appointment at [phone], Disp: 90 tablet, Rfl: 1     Multiple Vitamin (MULTIVITAMINS PO), , Disp: , Rfl:      rosuvastatin (CRESTOR) 10 MG tablet, Take 1 tablet (10 mg) by mouth daily, Disp: 90 tablet, Rfl: 3     thin (NO BRAND SPECIFIED) lancets, 4x daily. Use with lanceting device. Any covered brand., Disp: 400 each, Rfl: 4     blood glucose (NO BRAND SPECIFIED) test strip, Use to test blood sugar 4 times daily or as directed.  Accu-chek guide needed per pump requirements., Disp: 400 strip, Rfl: 4      Allergies:   Patient has no known allergies.      Past Medical History:  Type 1 diabetes mellitus  Dyslipidemia  Chronic lymphocytic thyroiditis  Coronary artery disease    Family History:   Paternal grandmother: macular degeneration      Social History:     Non-smoker     Physical Exam:   BP (!) 154/73 (BP Location: Left arm, Patient Position: Sitting, Cuff Size: Adult Regular)   Pulse 73   Ht 1.778 m (5' 10\")   Wt 75.5 kg (166 lb 8 oz)   SpO2 99%   BMI 23.89 kg/m       Constitutional: no distress, comfortable, pleasant   Eyes: anicteric,  No lig lag, retraction or proptosis  Neck: supple, no thyromegaly.   Cardiovascular: regular rate and rhythm, normal S1 and S2, no murmurs  Respiratory: clear to auscultation, no wheezes or crackles, normal breath sounds   Gastrointestinal: nontender, no striae   Musculoskeletal: no edema   Skin:  no lipohypertrophy at injection sites  Neurological: cranial nerves intact, mild fine tremor left extended hand  Psychological: appropriate " mood   Lymphatic: no cervical lymphadenopathy  Feet: Monofilament exam normal bilaterally, no ulcers, foot pulses palpable    Data:     Latest Reference Range & Units 01/25/23 11:57   Sodium 133 - 144 mmol/L 139   Potassium 3.4 - 5.3 mmol/L  3.4 - 5.3 mmol/L 4.2  4.2   Chloride 94 - 109 mmol/L 105   Carbon Dioxide 20 - 32 mmol/L 30   Urea Nitrogen 7 - 30 mg/dL 10   Creatinine 0.66 - 1.25 mg/dL 0.94   GFR Estimate >60 mL/min/1.73m2 >90   Anion Gap 3 - 14 mmol/L 4   Cholesterol <200 mg/dL 135   Patient Fasting > 8hrs?  Unknown   HDL Cholesterol >=40 mg/dL 59   Hemoglobin A1C 0.0 - 5.6 % 6.6 (H)   LDL Cholesterol Calculated <=100 mg/dL 61   Non HDL Cholesterol <130 mg/dL 76   Triglycerides <150 mg/dL 75   TSH 0.40 - 4.00 mU/L 1.20   (H): Data is abnormally high    Assessment and Plan:  Type 1 diabetes mellitus (H)  Overall excellent control without any known diabetes related microvascular complications  No change in pump settings today  Patient is interested in trying ultra rapid acting insulin and his Medtronic pump  Will consider trial of Fiasp or Lyumjev based on insurance coverage  Blood pressure was high in clinic today.  Patient has blood pressure monitor and will do home blood pressure monitoring    Dyslipidemia  On statin    Hypothyroidism   TSH has been stable for many years on the current dose of levothyroxine.    Counseled to establish care with primary care provider.  Would be due for routine screening like colonoscopy    Follow-up: In 1 year     Note: Chart documentation done in part with Dragon Voice Recognition software. Although reviewed after completion, some word and grammatical errors may remain.  Please consider this when interpreting information in this chart    CHARLES Payne              Again, thank you for allowing me to participate in the care of your patient.        Sincerely,        Boyd Adler MD

## 2023-01-25 NOTE — NURSING NOTE
"Schuyler Gallardo's goals for this visit include:   Chief Complaint   Patient presents with     RECHECK     Return diabetes     He requests these members of his care team be copied on today's visit information: No    PCP: No Ref-Primary, Physician    Referring Provider:  Referred Self, MD  No address on file    BP (!) 154/73 (BP Location: Left arm, Patient Position: Sitting, Cuff Size: Adult Regular)   Pulse 73   Ht 1.778 m (5' 10\")   Wt 75.5 kg (166 lb 8 oz)   SpO2 99%   BMI 23.89 kg/m      Do you need any medication refills at today's visit? Yes    "

## 2023-01-25 NOTE — PROGRESS NOTES
Outcome for 01/07/22 11:03 AM: Left Voicemail for blood glucose data Fallon Trivedi   Virtual Visit Facilitator  Outcome for 01/07/22 3:31 PM: Left Voicemail    Outcome for 01/10/22 10:15 AM: Data emailed to [facility]  Endocrinology  Amicitlaly Adler MD  01/25/2023      Chief Complaint:   Diabetes    History of Present Illness:   Schuyler Gallardo is a 52  year old male with a history of type 1 diabetes mellitus and chronic lymphocytic thyroiditis.    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    CGM today was reviewed.  Over the last 2 weeks average sensor glucose is 137 standard deviation of 47, time in range 82% low 2%, very low 0%.  Average daily insulin use 28 units, 38% basal    Overall he feels well and denies any acute complaints today.  Denies any symptoms of chest pain or pressure, excessive shortness of breath, tingling or numbness in fingers or toes.    Denies any symptoms concerning for ED  He is currently on Crestor 10 mg daily.  Higher doses of Crestor leads to myalgia    He is interested in trying the extended use Medtronic infusion set as well as trying ultra rapid acting insulin in the Medtronic pump.    Blood Glucose Monitoring:  Noted above    Current Insulin Pump Settings:  Type of Pump: Medtronic Minimed: Model 670G-pump settings updated today  Current Settings:   BASAL RATES and times:  12   AM (midnight): 0.725 units/hour    1     AM: 0.625 units/hour   10:30  AM: 0.475 units/hour   5:30   PM: 0.625 units/hour    CARB RATIO and times:  12   AM (midnight): 15   Corection Factor (Sensitivity) and times:  12   AM (midnight): 35 mg/dL  4 AM   40   BLOOD GLUCOSE TARGET and times:  12   AM (midnight): 90 - 110       Amount of Time Insulin is Active:  2 hrs    Diabetes monitoring and complications:  CAD: Yes- reports having a high coronary calcium score and coronary artery disease without obstruction on screening cardiac testing.  Last eye exam results: 2022.  "No DR  Microalbuminuria: negative   Neuropathy: No  HTN: No  On Statin: Yes  On Aspirin: Yes  Depression:   Erectile dysfunction: No    Patient Supplied Answers To Diabetic Questionnaire  No flowsheet data found.     Review of Systems:   Pertinent items are noted in HPI.  All other systems are negative.    Active Medications:     Current Outpatient Medications:      aspirin 81 MG tablet, Take 1 tablet (81 mg) by mouth daily, Disp: 30 tablet, Rfl:      blood glucose (NO BRAND SPECIFIED) test strip, Use to test blood sugar 5 times daily or as directed. ACCU CHECK GUIDE STRIPS, Disp: 450 strip, Rfl: 3     blood glucose monitoring (ACCU-CHEK FASTCLIX) lancets, Use to test blood sugar 5 times daily or as directed., Disp: 450 each, Rfl: 3     Continuous Blood Gluc Sensor (GUARDIAN SENSOR 3) MISC, 1 each every 7 days, Disp: 12 each, Rfl: 3     Continuous Blood Gluc Sensor (GUARDIAN SENSOR, 3,) MISC, 1 each every 7 days, Disp: 5 each, Rfl: 11     Continuous Blood Gluc Sensor (MINIMED GUARDIAN SENSOR 3) MISC, 1 each once a week Change every 5 days, Disp: 10 each, Rfl: 11     Continuous Blood Gluc Transmit (MINIMED GUARDIAN LINK 3) MISC, 1 each See Admin Instructions Use per 's instructions., Disp: 1 each, Rfl: 3     Glucagon (BAQSIMI TWO PACK) 3 MG/DOSE POWD, Spray 3 mg in nostril once as needed (for severe hypoglycemia), Disp: 1 each, Rfl: 3     infusion set (BIJAN 23\" 6MM) [facility] pump supply, Infusion set to be used with pump.  Change every 2-3 days as directed., Disp: 4 Box, Rfl: 4     Insulin Infusion Pump Supplies (MINIMED PUMP RESERVOIR 3ML) [facility], Change every 2-3 days., Disp: 45 each, Rfl: 4     insulin lispro (HUMALOG) 100 UNIT/ML vial, Use in pump basal rate plus meal coverage approx 50-60 units daily, Disp: 60 mL, Rfl: 3     Insulin Pump Accessories MISC, [place] Advance infusion sets, Disp: 10 each, Rfl: 3     insulin syringes, disposable, U-100 0.5 ML MISC, Use 4 times daily as needed in case of insulin pump " "failure, Disp: 100 each, Rfl: 1     levothyroxine (SYNTHROID/LEVOTHROID) 112 MCG tablet, Take 1 tablet (112 mcg) by mouth daily For additional refills, please schedule a follow-up appointment at [phone], Disp: 90 tablet, Rfl: 1     Multiple Vitamin (MULTIVITAMINS PO), , Disp: , Rfl:      rosuvastatin (CRESTOR) 10 MG tablet, Take 1 tablet (10 mg) by mouth daily, Disp: 90 tablet, Rfl: 3     thin (NO BRAND SPECIFIED) lancets, 4x daily. Use with lanceting device. Any covered brand., Disp: 400 each, Rfl: 4     blood glucose (NO BRAND SPECIFIED) test strip, Use to test blood sugar 4 times daily or as directed.  Accu-chek guide needed per pump requirements., Disp: 400 strip, Rfl: 4      Allergies:   Patient has no known allergies.      Past Medical History:  Type 1 diabetes mellitus  Dyslipidemia  Chronic lymphocytic thyroiditis  Coronary artery disease    Family History:   Paternal grandmother: macular degeneration      Social History:     Non-smoker     Physical Exam:   BP (!) 154/73 (BP Location: Left arm, Patient Position: Sitting, Cuff Size: Adult Regular)   Pulse 73   Ht 1.778 m (5' 10\")   Wt 75.5 kg (166 lb 8 oz)   SpO2 99%   BMI 23.89 kg/m       Constitutional: no distress, comfortable, pleasant   Eyes: anicteric,  No lig lag, retraction or proptosis  Neck: supple, no thyromegaly.   Cardiovascular: regular rate and rhythm, normal S1 and S2, no murmurs  Respiratory: clear to auscultation, no wheezes or crackles, normal breath sounds   Gastrointestinal: nontender, no striae   Musculoskeletal: no edema   Skin:  no lipohypertrophy at injection sites  Neurological: cranial nerves intact, mild fine tremor left extended hand  Psychological: appropriate mood   Lymphatic: no cervical lymphadenopathy  Feet: Monofilament exam normal bilaterally, no ulcers, foot pulses palpable    Data:     Latest Reference Range & Units 01/25/23 11:57   Sodium 133 - 144 mmol/L 139   Potassium 3.4 - 5.3 mmol/L  3.4 - 5.3 " mmol/L 4.2  4.2   Chloride 94 - 109 mmol/L 105   Carbon Dioxide 20 - 32 mmol/L 30   Urea Nitrogen 7 - 30 mg/dL 10   Creatinine 0.66 - 1.25 mg/dL 0.94   GFR Estimate >60 mL/min/1.73m2 >90   Anion Gap 3 - 14 mmol/L 4   Cholesterol <200 mg/dL 135   Patient Fasting > 8hrs?  Unknown   HDL Cholesterol >=40 mg/dL 59   Hemoglobin A1C 0.0 - 5.6 % 6.6 (H)   LDL Cholesterol Calculated <=100 mg/dL 61   Non HDL Cholesterol <130 mg/dL 76   Triglycerides <150 mg/dL 75   TSH 0.40 - 4.00 mU/L 1.20   (H): Data is abnormally high    Assessment and Plan:  Type 1 diabetes mellitus (H)  Overall excellent control without any known diabetes related microvascular complications  No change in pump settings today  Patient is interested in trying ultra rapid acting insulin and his Medtronic pump  Will consider trial of Fiasp or Lyumjev based on insurance coverage  Blood pressure was high in clinic today.  Patient has blood pressure monitor and will do home blood pressure monitoring    Dyslipidemia  On statin    Hypothyroidism   TSH has been stable for many years on the current dose of levothyroxine.    Counseled to establish care with primary care provider.  Would be due for routine screening like colonoscopy    Follow-up: In 1 year     Note: Chart documentation done in part with Dragon Voice Recognition software. Although reviewed after completion, some word and grammatical errors may remain.  Please consider this when interpreting information in this chart    CHARLES Payne

## 2023-01-27 ENCOUNTER — TELEPHONE (OUTPATIENT)
Dept: EDUCATION SERVICES | Facility: CLINIC | Age: 53
End: 2023-01-27
Payer: COMMERCIAL

## 2023-01-27 ENCOUNTER — MYC MEDICAL ADVICE (OUTPATIENT)
Dept: EDUCATION SERVICES | Facility: CLINIC | Age: 53
End: 2023-01-27
Payer: COMMERCIAL

## 2023-01-27 DIAGNOSIS — E10.65 TYPE 1 DIABETES MELLITUS WITH HYPERGLYCEMIA (H): Primary | ICD-10-CM

## 2023-01-27 RX ORDER — INFUSION SET FOR INSULIN PUMP
1 INFUSION SETS-PARAPHERNALIA MISCELLANEOUS
Qty: 12 EACH | Refills: 3 | Status: SHIPPED | OUTPATIENT
Start: 2023-01-27 | End: 2023-08-03

## 2023-01-27 RX ORDER — INSULIN LISPRO-AABC 100 [IU]/ML
INJECTION, SOLUTION INTRAVENOUS; SUBCUTANEOUS
Qty: 40 ML | Refills: 3 | Status: SHIPPED | OUTPATIENT
Start: 2023-01-27 | End: 2024-04-16

## 2023-01-27 NOTE — TELEPHONE ENCOUNTER
Left detailed message per patient request. Prescription for Lymujev completed to [facility] and extended [place] Advance infusion sets to [facility]. Decrease Active Insulin time to 2.5 hours from 3 once changes to Lymujev.    My Chart Message also sent to patient.    Lluvia Moon RN, Diabetes Educator  Diabetes Education Department  [facility], [place] and [place]  [phone]      Per Dr. Adler:  Fiasp and Lyumjev are both approved for use in the Medtronic pump.  We could choose whatever is going to be covered by his insurance.  would recommend the patient to shorten up their active insulin time by about 30 minutes from what it is set at now, but no lower than 2 hours.

## 2023-02-01 ENCOUNTER — TELEPHONE (OUTPATIENT)
Dept: ENDOCRINOLOGY | Facility: CLINIC | Age: 53
End: 2023-02-01
Payer: COMMERCIAL

## 2023-02-01 RX ORDER — LEVOTHYROXINE SODIUM 112 UG/1
112 TABLET ORAL DAILY
Qty: 90 TABLET | Refills: 3 | Status: SHIPPED | OUTPATIENT
Start: 2023-02-01 | End: 2024-04-16

## 2023-02-01 RX ORDER — BLOOD-GLUCOSE SENSOR
1 EACH MISCELLANEOUS
Qty: 12 EACH | Refills: 3 | Status: SHIPPED | OUTPATIENT
Start: 2023-02-01 | End: 2024-04-16

## 2023-02-01 RX ORDER — LANCETS
EACH MISCELLANEOUS
Qty: 450 EACH | Refills: 3 | Status: SHIPPED | OUTPATIENT
Start: 2023-02-01

## 2023-02-01 RX ORDER — ROSUVASTATIN CALCIUM 10 MG/1
10 TABLET, COATED ORAL DAILY
Qty: 90 TABLET | Refills: 3 | Status: SHIPPED | OUTPATIENT
Start: 2023-02-01

## 2023-02-01 RX ORDER — INSULIN PUMP SYRINGE, 3 ML
EACH MISCELLANEOUS
Qty: 45 EACH | Refills: 4 | Status: SHIPPED | OUTPATIENT
Start: 2023-02-01 | End: 2023-08-03

## 2023-02-01 NOTE — TELEPHONE ENCOUNTER
Left Voicemail (1st Attempt) for the patient to call back and schedule the following:    Appointment type: Return Diabetes  Provider: Dr. Adler  Return date: 1/24/2024  Specialty phone number: [phone]  Additional appointment(s) needed:   Additonal Notes:       Return in about 1 year (around 1/25/2024).    Also sent a  eFuneral message.    Samira R/Procedure    [facility]   Neurology, NeuroSurgery, NeuroPsychology and Pain Management Specialties  Medical/Surgical Adult Specialties

## 2023-04-15 ENCOUNTER — HEALTH MAINTENANCE LETTER (OUTPATIENT)
Age: 53
End: 2023-04-15

## 2023-05-16 ENCOUNTER — MEDICAL CORRESPONDENCE (OUTPATIENT)
Dept: HEALTH INFORMATION MANAGEMENT | Facility: CLINIC | Age: 53
End: 2023-05-16
Payer: COMMERCIAL

## 2023-05-18 ENCOUNTER — MYC MEDICAL ADVICE (OUTPATIENT)
Dept: ENDOCRINOLOGY | Facility: CLINIC | Age: 53
End: 2023-05-18
Payer: COMMERCIAL

## 2023-05-22 ENCOUNTER — TELEPHONE (OUTPATIENT)
Dept: ENDOCRINOLOGY | Facility: CLINIC | Age: 53
End: 2023-05-22
Payer: COMMERCIAL

## 2023-05-22 NOTE — TELEPHONE ENCOUNTER
[facility] Call Center    Phone Message    May a detailed message be left on voicemail: yes     Reason for Call: Other: form from Medtronic for the patient's new pump is not fully completed, needs smartguard info checked off and signed and refaxed to [employer]     Action Taken: Other: endo    Travel Screening: Not Applicable

## 2023-05-26 ENCOUNTER — TELEPHONE (OUTPATIENT)
Dept: EDUCATION SERVICES | Facility: CLINIC | Age: 53
End: 2023-05-26
Payer: COMMERCIAL

## 2023-05-26 NOTE — TELEPHONE ENCOUNTER
780 pump upgrade prescription updated with the following and faxed to [facility].  Smartguard Target=100  Active insulin time= 2 hours  Suspend before low    Lluvia Moon RN, Diabetes Educator  Diabetes Education Department  [facility], [place] and [place]  [phone]

## 2023-07-03 ENCOUNTER — TELEPHONE (OUTPATIENT)
Dept: ENDOCRINOLOGY | Facility: CLINIC | Age: 53
End: 2023-07-03
Payer: COMMERCIAL

## 2023-07-03 NOTE — TELEPHONE ENCOUNTER
Left Voicemail (2nd Attempt) for the patient to call back and schedule the following:    Appointment type: Return  Provider: Dr. Adler  Return date: 1/25/2024  Specialty phone number: [phone]  Additonal Notes: Return in about 1 year (around 1/25/2024).    Elyssa mccormick Procedure   Dermatology, Surgery, Urology  [facility]

## 2023-08-10 DIAGNOSIS — E10.65 TYPE 1 DIABETES MELLITUS WITH HYPERGLYCEMIA (H): Primary | ICD-10-CM

## 2023-08-10 RX ORDER — BLOOD-GLUCOSE SENSOR
1 EACH MISCELLANEOUS
Qty: 15 EACH | Refills: 3 | Status: SHIPPED | OUTPATIENT
Start: 2023-08-10

## 2023-09-16 ENCOUNTER — HEALTH MAINTENANCE LETTER (OUTPATIENT)
Age: 53
End: 2023-09-16

## 2024-01-01 ENCOUNTER — MYC REFILL (OUTPATIENT)
Dept: ENDOCRINOLOGY | Facility: CLINIC | Age: 54
End: 2024-01-01
Payer: COMMERCIAL

## 2024-01-01 DIAGNOSIS — E03.9 HYPOTHYROIDISM, UNSPECIFIED TYPE: ICD-10-CM

## 2024-01-01 DIAGNOSIS — E78.5 DYSLIPIDEMIA: ICD-10-CM

## 2024-01-01 DIAGNOSIS — E10.65 TYPE 1 DIABETES MELLITUS WITH HYPERGLYCEMIA (H): ICD-10-CM

## 2024-01-01 DIAGNOSIS — E55.9 VITAMIN D DEFICIENCY: ICD-10-CM

## 2024-01-02 RX ORDER — INSULIN LISPRO 100 [IU]/ML
INJECTION, SOLUTION INTRAVENOUS; SUBCUTANEOUS
Qty: 60 ML | Refills: 0 | Status: SHIPPED | OUTPATIENT
Start: 2024-01-02 | End: 2024-04-16

## 2024-01-02 NOTE — TELEPHONE ENCOUNTER
Short Acting Insulin Protocol Failed 01/01/2024 04:14 PM   Protocol Details  HgbA1C in past 3 or 6 months    Recent (6 mo) or future (30 days) visit within the authorizing provider's specialty     RTC in place APR 16 2024

## 2024-01-07 DIAGNOSIS — E10.65 TYPE 1 DIABETES MELLITUS WITH HYPERGLYCEMIA (H): ICD-10-CM

## 2024-01-08 DIAGNOSIS — E10.65 TYPE 1 DIABETES MELLITUS WITH HYPERGLYCEMIA (H): ICD-10-CM

## 2024-01-08 RX ORDER — INFUSION SET FOR INSULIN PUMP
INFUSION SETS-PARAPHERNALIA MISCELLANEOUS
Qty: 10 EACH | Refills: 1 | Status: SHIPPED | OUTPATIENT
Start: 2024-01-08 | End: 2024-06-03

## 2024-01-08 RX ORDER — INFUSION SET FOR INSULIN PUMP
INFUSION SETS-PARAPHERNALIA MISCELLANEOUS
Qty: 30 EACH | Refills: 1 | Status: SHIPPED | OUTPATIENT
Start: 2024-01-08 | End: 2024-04-16

## 2024-01-08 NOTE — TELEPHONE ENCOUNTER
" Medtronic Extended Infusion Set 23\" (Infusion Set for Insulin Pump)       Last Written Prescription Date:  8-3-23  Last Fill Quantity: 12,   # refills: 1  Last Office Visit : 1-25-23  Future Office visit:  4-16-24    Routing refill request to provider for review/approval because:  Insulin  pump supplies - refilled per clinic       "

## 2024-04-01 NOTE — PROGRESS NOTES
04/01/24 11:12 AM  PATIENT LAB/IMAGING STATUS : No pending lab orders   Outcome for 04/15/24 1:42 PM: Data obtained via [employer]

## 2024-04-13 ENCOUNTER — HEALTH MAINTENANCE LETTER (OUTPATIENT)
Age: 54
End: 2024-04-13

## 2024-04-16 ENCOUNTER — OFFICE VISIT (OUTPATIENT)
Dept: ENDOCRINOLOGY | Facility: CLINIC | Age: 54
End: 2024-04-16
Payer: COMMERCIAL

## 2024-04-16 ENCOUNTER — LAB (OUTPATIENT)
Dept: LAB | Facility: CLINIC | Age: 54
End: 2024-04-16
Payer: COMMERCIAL

## 2024-04-16 VITALS
HEIGHT: 70 IN | SYSTOLIC BLOOD PRESSURE: 146 MMHG | OXYGEN SATURATION: 100 % | BODY MASS INDEX: 23.62 KG/M2 | HEART RATE: 68 BPM | DIASTOLIC BLOOD PRESSURE: 80 MMHG | WEIGHT: 165 LBS

## 2024-04-16 DIAGNOSIS — E78.5 DYSLIPIDEMIA: ICD-10-CM

## 2024-04-16 DIAGNOSIS — E03.9 HYPOTHYROIDISM, UNSPECIFIED TYPE: ICD-10-CM

## 2024-04-16 DIAGNOSIS — E10.65 TYPE 1 DIABETES MELLITUS WITH HYPERGLYCEMIA (H): ICD-10-CM

## 2024-04-16 LAB
ALBUMIN SERPL BCG-MCNC: 4.4 G/DL (ref 3.5–5.2)
ALP SERPL-CCNC: 61 U/L (ref 40–150)
ALT SERPL W P-5'-P-CCNC: 16 U/L (ref 0–70)
ANION GAP SERPL CALCULATED.3IONS-SCNC: 8 MMOL/L (ref 7–15)
AST SERPL W P-5'-P-CCNC: 22 U/L (ref 0–45)
BILIRUB DIRECT SERPL-MCNC: 0.23 MG/DL (ref 0–0.3)
BILIRUB SERPL-MCNC: 0.7 MG/DL
BUN SERPL-MCNC: 13.8 MG/DL (ref 6–20)
CHLORIDE SERPL-SCNC: 104 MMOL/L (ref 98–107)
CHOLEST SERPL-MCNC: 105 MG/DL
CREAT SERPL-MCNC: 1.18 MG/DL (ref 0.67–1.17)
DEPRECATED HCO3 PLAS-SCNC: 29 MMOL/L (ref 22–29)
EGFRCR SERPLBLD CKD-EPI 2021: 75 ML/MIN/1.73M2
FASTING STATUS PATIENT QL REPORTED: NO
HBA1C MFR BLD: 6.8 %
HDLC SERPL-MCNC: 62 MG/DL
LDLC SERPL CALC-MCNC: 30 MG/DL
NONHDLC SERPL-MCNC: 43 MG/DL
POTASSIUM SERPL-SCNC: 4.1 MMOL/L (ref 3.4–5.3)
PROT SERPL-MCNC: 7.3 G/DL (ref 6.4–8.3)
SODIUM SERPL-SCNC: 141 MMOL/L (ref 135–145)
TRIGL SERPL-MCNC: 65 MG/DL
TSH SERPL DL<=0.005 MIU/L-ACNC: 1.58 UIU/ML (ref 0.3–4.2)

## 2024-04-16 PROCEDURE — 84520 ASSAY OF UREA NITROGEN: CPT | Performed by: PATHOLOGY

## 2024-04-16 PROCEDURE — 80051 ELECTROLYTE PANEL: CPT | Performed by: PATHOLOGY

## 2024-04-16 PROCEDURE — 80061 LIPID PANEL: CPT | Performed by: PATHOLOGY

## 2024-04-16 PROCEDURE — 99214 OFFICE O/P EST MOD 30 MIN: CPT | Performed by: INTERNAL MEDICINE

## 2024-04-16 PROCEDURE — 36415 COLL VENOUS BLD VENIPUNCTURE: CPT | Performed by: PATHOLOGY

## 2024-04-16 PROCEDURE — 84443 ASSAY THYROID STIM HORMONE: CPT | Performed by: PATHOLOGY

## 2024-04-16 PROCEDURE — 83036 HEMOGLOBIN GLYCOSYLATED A1C: CPT | Performed by: PATHOLOGY

## 2024-04-16 PROCEDURE — 80076 HEPATIC FUNCTION PANEL: CPT | Performed by: PATHOLOGY

## 2024-04-16 PROCEDURE — 99000 SPECIMEN HANDLING OFFICE-LAB: CPT | Performed by: PATHOLOGY

## 2024-04-16 PROCEDURE — 82570 ASSAY OF URINE CREATININE: CPT | Performed by: INTERNAL MEDICINE

## 2024-04-16 PROCEDURE — 82565 ASSAY OF CREATININE: CPT | Performed by: PATHOLOGY

## 2024-04-16 RX ORDER — CARVEDILOL PHOSPHATE 80 MG/1
75 CAPSULE, EXTENDED RELEASE ORAL DAILY
COMMUNITY
End: 2024-04-16

## 2024-04-16 RX ORDER — LOSARTAN POTASSIUM 25 MG/1
25 TABLET ORAL DAILY
COMMUNITY

## 2024-04-16 RX ORDER — LEVOTHYROXINE SODIUM 112 UG/1
112 TABLET ORAL DAILY
Qty: 90 TABLET | Refills: 3 | Status: SHIPPED | OUTPATIENT
Start: 2024-04-16

## 2024-04-16 RX ORDER — METOPROLOL SUCCINATE 25 MG/1
12.5 TABLET, EXTENDED RELEASE ORAL DAILY
COMMUNITY

## 2024-04-16 RX ORDER — INSULIN LISPRO 100 [IU]/ML
INJECTION, SOLUTION INTRAVENOUS; SUBCUTANEOUS
Qty: 60 ML | Refills: 5 | Status: SHIPPED | OUTPATIENT
Start: 2024-04-16

## 2024-04-16 ASSESSMENT — PAIN SCALES - GENERAL: PAINLEVEL: NO PAIN (0)

## 2024-04-16 NOTE — PROGRESS NOTES
[facility]  Endocrinology  Amicitlaly Adler MD  04/16/2024      Chief Complaint:   Diabetes    History of Present Illness:   Schuyler Gallardo is a 53  year old male with a history of type 1 diabetes mellitus and chronic lymphocytic thyroiditis.    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    Pump/CGM today was reviewed.  Over the last 2 weeks average sensor glucose is 146, time in range 78% low 2%, very low 0%.  Average daily insulin use 29 units, 40% basal    Overall he feels well and denies any acute complaints today.   He was diagnosed with acute anteroseptal myocardial infarction in 4/2023 due to major diagonal occlusions status post PTCA with balloon only.   He is following with cardiology at [facility]  He has been started on Repatha in addition to statin  Overall doing well, complains of some stable exertional angina    He tried ultra-rapid acting insulin in the Medtronic pump but did not see much benefit in terms of glycemic control and it was irritating at the infusion site.    He is using Extended use Medtronic infusion set and finds it very helpful    Blood Glucose Monitoring:  Pump and CGM data noted in MA note    Current Insulin Pump Settings:  Type of Pump: Medtronic   Current Settings:   Pump settings noted in MA note    Diabetes monitoring and complications:  CAD: Yes  Last eye exam results: 2022. No DR  Microalbuminuria: negative   Neuropathy: No  HTN: No  On Statin: Yes, additionally on Repatha  On Aspirin: Yes  Erectile dysfunction: No    Patient Supplied Answers To Diabetic Questionnaire       No data to display                 Review of Systems:   Pertinent items are noted in HPI.  All other systems are negative.    Active Medications:     Current Outpatient Medications:     aspirin 81 MG tablet, Take 1 tablet (81 mg) by mouth daily, Disp: 30 tablet, Rfl:     blood glucose (NO BRAND SPECIFIED) test strip, Use to test blood sugar 5 times daily or as directed. ACCU CHECK  "GUIDE STRIPS, Disp: 450 strip, Rfl: 3    blood glucose (NO BRAND SPECIFIED) test strip, Use to test blood sugar 4 times daily or as directed.  Accu-chek guide needed per pump requirements., Disp: 400 strip, Rfl: 4    blood glucose monitoring (ACCU-CHEK FASTCLIX) lancets, Use to test blood sugar 5 times daily or as directed., Disp: 450 each, Rfl: 3    carvedilol ER (COREG CR) 80 MG 24 hr capsule, Take 75 mg by mouth daily, Disp: , Rfl:     Continuous Blood Gluc Sensor (GUARDIAN 4 GLUCOSE SENSOR) MISC, 1 Device every 7 days, Disp: 15 each, Rfl: 3    Continuous Blood Gluc Sensor (MINIMED GUARDIAN SENSOR 3) MISC, 1 each once a week Change every 5 days, Disp: 10 each, Rfl: 11    Continuous Blood Gluc Transmit (MINIMED GUARDIAN LINK 3) MISC, 1 each See Admin Instructions Use per 's instructions., Disp: 1 each, Rfl: 3    Glucagon (BAQSIMI TWO PACK) 3 MG/DOSE POWD, Spray 3 mg in nostril once as needed (for severe hypoglycemia), Disp: 1 each, Rfl: 3    infusion set (BIJAN 23\" 6MM) [facility] pump supply, Infusion set to be used with pump.  Change every 2-3 days as directed. Lab draw and follow up visit needed. [place]  to schedule., Disp: 30 each, Rfl: 1    Insulin Infusion Pump Supplies (EXTENDED INFUSION SET 23\"/6MM) MISC, Change every 7 days. (REF# [id number]  NDC [id number]) [place] [employer] Extended. Lab draw and follow up visit needed. Call  to schedule., Disp: 10 each, Rfl: 1    Insulin Infusion Pump Supplies (MINIMED PUMP RESERVOIR 3ML) [facility], Change every 2-3 days. Lab draw and follow up visit needed. Call  to schedule., Disp: 45 each, Rfl: 0    insulin lispro (HUMALOG VIAL) 100 UNIT/ML vial, Via insulin pump. Approx 60 units daily., Disp: 60 mL, Rfl: 0    insulin lispro (HUMALOG) 100 UNIT/ML vial, Use in pump basal rate plus meal coverage approx 50-60 units daily, Disp: 60 mL, Rfl: 3    Insulin Pump Accessories MISC, [place] Advance infusion sets, Disp: 10 each, Rfl: 3    insulin syringes, " "disposable, U-100 0.5 ML MISC, Use 4 times daily as needed in case of insulin pump failure, Disp: 100 each, Rfl: 1    levothyroxine (SYNTHROID/LEVOTHROID) 112 MCG tablet, Take 1 tablet (112 mcg) by mouth daily For additional refills, please schedule a follow-up appointment at [phone], Disp: 90 tablet, Rfl: 3    losartan (COZAAR) 25 MG tablet, Take 25 mg by mouth daily, Disp: , Rfl:     metoprolol succinate ER (TOPROL XL) 25 MG 24 hr tablet, Take 12.5 mg by mouth daily, Disp: , Rfl:     Multiple Vitamin (MULTIVITAMINS PO), , Disp: , Rfl:     rosuvastatin (CRESTOR) 10 MG tablet, Take 1 tablet (10 mg) by mouth daily, Disp: 90 tablet, Rfl: 3    thin (NO BRAND SPECIFIED) lancets, 4x daily. Use with lanceting device. Any covered brand., Disp: 400 each, Rfl: 4      Allergies:   Patient has no known allergies.      Past Medical History:  Type 1 diabetes mellitus  Dyslipidemia  Chronic lymphocytic thyroiditis  Coronary artery disease    Family History:   Paternal grandmother: macular degeneration      Social History:     Non-smoker     Physical Exam:   BP (!) 146/80   Pulse 68   Ht 1.778 m (5' 10\")   Wt 74.8 kg (165 lb)   SpO2 100%   BMI 23.68 kg/m       Constitutional: no distress, comfortable, pleasant   Eyes: anicteric,  No lig lag, retraction or proptosis  Neck: supple, no thyromegaly.   Cardiovascular: regular rate and rhythm, normal S1 and S2, no murmurs  Respiratory: clear to auscultation, no wheezes or crackles, normal breath sounds   Musculoskeletal: no edema   Skin:  no lipohypertrophy at injection sites  Psychological: appropriate mood     Data:  Cardiology notes reviewed in care everywhere    Assessment and Plan:  Type 1 diabetes mellitus (H)  Overall  control with A1c of 6.8%  No change in pump settings today  Blood pressure was high in clinic today.  However, per patient report home blood pressure readings are in range    Dyslipidemia  On statin and Repatha.  Follows with " cardiology    Hypothyroidism   Check TSH    Orders Placed This Encounter   Procedures    AFINION HEMOGLOBIN A1C POCT    Albumin Random Urine Quantitative with Creat Ratio    Creatinine    Urea nitrogen    Lipid Profile    Electrolyte panel    TSH    Hepatic panel      Follow-up: In 1 year or sooner as needed     Note: Chart documentation done in part with Dragon Voice Recognition software. Although reviewed after completion, some word and grammatical errors may remain.  Please consider this when interpreting information in this chart    CHARLES Payne

## 2024-04-16 NOTE — LETTER
4/16/2024       RE: Schuyler Gallardo  [address] [facility] [address]     Dear Colleague,    Thank you for referring your patient, Schuyler Gallardo, to the [facility] ENDOCRINOLOGY CLINIC [place] at [facility]. Please see a copy of my visit note below.    04/01/24 11:12 AM  PATIENT LAB/IMAGING STATUS : No pending lab orders   Outcome for 04/15/24 1:42 PM: Data obtained via [employer] website                        [facility]  Endocrinology  Amir Drew Adler MD  04/16/2024      Chief Complaint:   Diabetes    History of Present Illness:   Schuyler Gallardo is a 53  year old male with a history of type 1 diabetes mellitus and chronic lymphocytic thyroiditis.    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    Pump/CGM today was reviewed.  Over the last 2 weeks average sensor glucose is 146, time in range 78% low 2%, very low 0%.  Average daily insulin use 29 units, 40% basal    Overall he feels well and denies any acute complaints today.   He was diagnosed with acute anteroseptal myocardial infarction in 4/2023 due to major diagonal occlusions status post PTCA with balloon only.   He is following with cardiology at [facility]  He has been started on Repatha in addition to statin  Overall doing well, complains of some stable exertional angina    He tried ultra-rapid acting insulin in the Medtronic pump but did not see much benefit in terms of glycemic control and it was irritating at the infusion site.    He is using Extended use Medtronic infusion set and finds it very helpful    Blood Glucose Monitoring:  Pump and CGM data noted in MA note    Current Insulin Pump Settings:  Type of Pump: Medtronic   Current Settings:   Pump settings noted in MA note    Diabetes monitoring and complications:  CAD: Yes  Last eye exam results: 2022. No   Microalbuminuria: negative   Neuropathy: No  HTN: No  On Statin: Yes, additionally on Repatha  On Aspirin:  "Yes  Erectile dysfunction: No    Patient Supplied Answers To Diabetic Questionnaire       No data to display                 Review of Systems:   Pertinent items are noted in HPI.  All other systems are negative.    Active Medications:     Current Outpatient Medications:     aspirin 81 MG tablet, Take 1 tablet (81 mg) by mouth daily, Disp: 30 tablet, Rfl:     blood glucose (NO BRAND SPECIFIED) test strip, Use to test blood sugar 5 times daily or as directed. ACCU CHECK GUIDE STRIPS, Disp: 450 strip, Rfl: 3    blood glucose (NO BRAND SPECIFIED) test strip, Use to test blood sugar 4 times daily or as directed.  Accu-chek guide needed per pump requirements., Disp: 400 strip, Rfl: 4    blood glucose monitoring (ACCU-CHEK FASTCLIX) lancets, Use to test blood sugar 5 times daily or as directed., Disp: 450 each, Rfl: 3    carvedilol ER (COREG CR) 80 MG 24 hr capsule, Take 75 mg by mouth daily, Disp: , Rfl:     Continuous Blood Gluc Sensor (GUARDIAN 4 GLUCOSE SENSOR) MISC, 1 Device every 7 days, Disp: 15 each, Rfl: 3    Continuous Blood Gluc Sensor (MINIMED GUARDIAN SENSOR 3) MISC, 1 each once a week Change every 5 days, Disp: 10 each, Rfl: 11    Continuous Blood Gluc Transmit (MINIMED GUARDIAN LINK 3) MISC, 1 each See Admin Instructions Use per 's instructions., Disp: 1 each, Rfl: 3    Glucagon (BAQSIMI TWO PACK) 3 MG/DOSE POWD, Spray 3 mg in nostril once as needed (for severe hypoglycemia), Disp: 1 each, Rfl: 3    infusion set (BIJAN 23\" 6MM) [facility] pump supply, Infusion set to be used with pump.  Change every 2-3 days as directed. Lab draw and follow up visit needed. Gilbert  to schedule., Disp: 30 each, Rfl: 1    Insulin Infusion Pump Supplies (EXTENDED INFUSION SET 23\"/6MM) MISC, Change every 7 days. (REF# [id number]  NDC [id number]) [place] Advance Extended. Lab draw and follow up visit needed. Call  to schedule., Disp: 10 each, Rfl: 1    Insulin Infusion Pump Supplies (MINIMED PUMP RESERVOIR " "3ML) MISC, Change every 2-3 days. Lab draw and follow up visit needed. Call  to schedule., Disp: 45 each, Rfl: 0    insulin lispro (HUMALOG VIAL) 100 UNIT/ML vial, Via insulin pump. Approx 60 units daily., Disp: 60 mL, Rfl: 0    insulin lispro (HUMALOG) 100 UNIT/ML vial, Use in pump basal rate plus meal coverage approx 50-60 units daily, Disp: 60 mL, Rfl: 3    Insulin Pump Accessories MISC, [place] Advance infusion sets, Disp: 10 each, Rfl: 3    insulin syringes, disposable, U-100 0.5 ML MISC, Use 4 times daily as needed in case of insulin pump failure, Disp: 100 each, Rfl: 1    levothyroxine (SYNTHROID/LEVOTHROID) 112 MCG tablet, Take 1 tablet (112 mcg) by mouth daily For additional refills, please schedule a follow-up appointment at [phone], Disp: 90 tablet, Rfl: 3    losartan (COZAAR) 25 MG tablet, Take 25 mg by mouth daily, Disp: , Rfl:     metoprolol succinate ER (TOPROL XL) 25 MG 24 hr tablet, Take 12.5 mg by mouth daily, Disp: , Rfl:     Multiple Vitamin (MULTIVITAMINS PO), , Disp: , Rfl:     rosuvastatin (CRESTOR) 10 MG tablet, Take 1 tablet (10 mg) by mouth daily, Disp: 90 tablet, Rfl: 3    thin (NO BRAND SPECIFIED) lancets, 4x daily. Use with lanceting device. Any covered brand., Disp: 400 each, Rfl: 4      Allergies:   Patient has no known allergies.      Past Medical History:  Type 1 diabetes mellitus  Dyslipidemia  Chronic lymphocytic thyroiditis  Coronary artery disease    Family History:   Paternal grandmother: macular degeneration      Social History:     Non-smoker     Physical Exam:   BP (!) 146/80   Pulse 68   Ht 1.778 m (5' 10\")   Wt 74.8 kg (165 lb)   SpO2 100%   BMI 23.68 kg/m       Constitutional: no distress, comfortable, pleasant   Eyes: anicteric,  No lig lag, retraction or proptosis  Neck: supple, no thyromegaly.   Cardiovascular: regular rate and rhythm, normal S1 and S2, no murmurs  Respiratory: clear to auscultation, no wheezes or crackles, normal breath sounds "   Musculoskeletal: no edema   Skin:  no lipohypertrophy at injection sites  Psychological: appropriate mood     Data:  Cardiology notes reviewed in care everywhere    Assessment and Plan:  Type 1 diabetes mellitus (H)  Overall  control with A1c of 6.8%  No change in pump settings today  Blood pressure was high in clinic today.  However, per patient report home blood pressure readings are in range    Dyslipidemia  On statin and Repatha.  Follows with cardiology    Hypothyroidism   Check TSH    Orders Placed This Encounter   Procedures    AFINION HEMOGLOBIN A1C POCT    Albumin Random Urine Quantitative with Creat Ratio    Creatinine    Urea nitrogen    Lipid Profile    Electrolyte panel    TSH    Hepatic panel      Follow-up: In 1 year or sooner as needed     Note: Chart documentation done in part with Dragon Voice Recognition software. Although reviewed after completion, some word and grammatical errors may remain.  Please consider this when interpreting information in this chart    CHARLES Payne

## 2024-04-17 LAB
CREAT UR-MCNC: 222 MG/DL
MICROALBUMIN UR-MCNC: <12 MG/L
MICROALBUMIN/CREAT UR: NORMAL MG/G{CREAT}

## 2024-04-25 ENCOUNTER — TELEPHONE (OUTPATIENT)
Dept: ENDOCRINOLOGY | Facility: CLINIC | Age: 54
End: 2024-04-25
Payer: COMMERCIAL

## 2024-04-25 NOTE — TELEPHONE ENCOUNTER
Left Voicemail (1st Attempt) and Sent Mychart (1st Attempt) for the patient to call back and schedule the following:    Appointment type: Return Diabetes  Provider: Dr. Adler  Return date: 1 year (around April 2025)  Specialty phone number: [phone]  Additional appointment(s) needed: NA  Additonal Notes: NA

## 2024-05-01 NOTE — LETTER
1/10/2022       RE: Schuyler Gallardo  [address] [facility] [address]     Dear Colleague,    Thank you for referring your patient, Schuyler Gallardo, to the [facility] ENDOCRINOLOGY CLINIC [place] at [facility]. Please see a copy of my visit note below.    Outcome for 01/07/22 11:03 AM: Left Voicemail for blood glucose data Fallon Trviedi   Virtual Visit Facilitator  Outcome for 01/07/22 3:31 PM: Left Voicemail    Outcome for 01/10/22 10:15 AM: Data emailed to provider     [facility]  Endocrinology  Boyd Adler MD  01/10/2022      Chief Complaint:   Diabetes    History of Present Illness:   Schuyler Gallardo is a 51 year old male with a history of type 1 diabetes mellitus and chronic lymphocytic thyroiditis.    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    CGM today was reviewed.  Over the last 2 weeks average sensor glucose is 141 with standard deviation of 46.  Auto mode use was 68% during this time.  Time in range 78%, 16% high, 2% very high, 2% low and 2% very low.  Total daily insulin use was 27 with 56% bolus.  Average carb entered per day during this time was 127 with standard deviation 96 g  Some of the lows tend to occur after a manual correction bolus.  Patient feels that the Medtronic auto bolus system is not aggressive and he has to take manual bolus otherwise it takes too long for the blood sugar to come down.   He can feel symptoms of low blood sugar and denies any severe hypoglycemia episode.    He exercises regularly.  Overall he feels well and denies any acute complaints today.  Denies any symptoms of chest pain or pressure, excessive shortness of breath, tingling or numbness in fingers or toes.  He monitors blood pressure at home and reports that systolic readings range between 120-135.  He reports that his weight is stable and recent weight was 161 pounds  He is currently on Crestor 10 mg daily.  Higher doses  Patient called and would like to know if the bleeding she is experiencing is normal to be having still after 1 week.   of Crestor leads to myalgia  Last routine labs were done more than 1 year ago.    Blood Glucose Monitoring:  Noted above    Current Insulin Pump Settings:  Type of Pump: Medtronic Minimed: Model 670G-pump settings updated today  Current Settings:   BASAL RATES and times:  12   AM (midnight): 0.725 units/hour    1     AM: 0.625 units/hour   10:30  AM: 0.475 units/hour   5:30   PM: 0.625 units/hour    CARB RATIO and times:  12   AM (midnight): 15   Corection Factor (Sensitivity) and times:  12   AM (midnight): 35 mg/dL  4 AM   40   BLOOD GLUCOSE TARGET and times:  12   AM (midnight): 90 - 110       Amount of Time Insulin is Active:  3 hrs    Diabetes monitoring and complications:  CAD: Yes- reports having a high coronary calcium score and coronary artery disease without obstruction on screening cardiac testing.  Last eye exam results: 5/21/2019. No DR  Microalbuminuria: negative   Neuropathy: No  HTN: No  On Statin: Yes  On Aspirin: Yes  Depression: Yes  Erectile dysfunction: No    Patient Supplied Answers To Diabetic Questionnaire  No flowsheet data found.     Review of Systems:   Pertinent items are noted in HPI.  All other systems are negative.    Active Medications:     Current Outpatient Medications:      aspirin 81 MG tablet, Take 1 tablet (81 mg) by mouth daily, Disp: 30 tablet, Rfl:      blood glucose (NO BRAND SPECIFIED) test strip, Use to test blood sugar 5 times daily or as directed. ACCU CHECK GUIDE STRIPS, Disp: 450 strip, Rfl: 3     blood glucose (NO BRAND SPECIFIED) test strip, Use to test blood sugar 4 times daily or as directed.  Accu-chek guide needed per pump requirements., Disp: 400 strip, Rfl: 4     blood glucose monitoring (ACCU-CHEK FASTCLIX) lancets, Use to test blood sugar 5 times daily or as directed., Disp: 450 each, Rfl: 3     Continuous Blood Gluc Sensor (MINIMED GUARDIAN SENSOR 3) MISC, 1 each once a week Change every 5 days, Disp: 10 each, Rfl: 11     Glucagon (BAQSIMI TWO PACK) 3  "MG/DOSE POWD, Spray 3 mg in nostril as needed (for severe hypoglycemia), Disp: 1 each, Rfl: 3     infusion set (BIJAN 23\" 6MM) misc pump supply, Infusion set to be used with pump.  Change every 2-3 days as directed., Disp: 4 Box, Rfl: 4     Insulin Infusion Pump Supplies (MINIMED PUMP RESERVOIR 3ML) MISC, Change every 2-3 days., Disp: 45 each, Rfl: 4     insulin lispro (HUMALOG) 100 UNIT/ML vial, Use in pump basal rate plus meal coverage approx 50-60 units daily, Disp: 60 mL, Rfl: 3     Insulin Pump Accessories (MINIMED GUARDIAN LINK 3) MISC, 1 each See Admin Instructions Use per 's instructions., Disp: 1 each, Rfl: 3     Insulin Pump Accessories MISC, [place] Advance infusion sets, Disp: 10 each, Rfl: 3     insulin syringes, disposable, U-100 0.5 ML MISC, Use 4 times daily as needed in case of insulin pump failure, Disp: 100 each, Rfl: 1     levothyroxine (SYNTHROID/LEVOTHROID) 112 MCG tablet, Take 1 tablet (112 mcg) by mouth daily, Disp: 90 tablet, Rfl: 1     Multiple Vitamin (MULTIVITAMINS PO), , Disp: , Rfl:      rosuvastatin (CRESTOR) 10 MG tablet, Take 1 tablet (10 mg) by mouth daily, Disp: 90 tablet, Rfl: 3     thin (NO BRAND SPECIFIED) lancets, 4x daily. Use with lanceting device. Any covered brand., Disp: 400 each, Rfl: 4     blood glucose monitoring (VIANNEY CONTOUR NEXT) test strip, Test 4-5 times daily / OR AS DIRECTED, Disp: 450 each, Rfl: 3      Allergies:   Patient has no known allergies.      Past Medical History:  Type 1 diabetes mellitus  Dyslipidemia  Chronic lymphocytic thyroiditis  Coronary artery disease    Family History:   Paternal grandmother: macular degeneration      Social History:     Non-smoker     Physical Exam:   There were no vitals taken for this visit.     GENERAL: Healthy, alert and no distress  EYES: Eyes grossly normal to inspection.    RESP: No audible wheeze, cough, or visible cyanosis.  No visible retractions or increased work of breathing.    NEURO:Mentation and " speech appropriate for age.  PSYCH: affect normal/bright, judgement and insight intact, normal speech and appearance well-groomed.    Data:  Recent labs reviewed in care everywhere    Assessment and Plan:  Type 1 diabetes mellitus (H)  Overall good control based on recent continuous glucose monitor data.  He would like to upgrade pump/CGM as soon as the new Medtronic system is approved  Occasional moderate hypoglycemia but he can easily anticipate/recognize and treat   Prescriptions refilled  He is due for routine labs and would do these in the coming weeks.  Also due for eye exam    Dyslipidemia  On statin, check lipids    Hypothyroidism   Has been on a stable dose of levothyroxine, check TSH    Follow-up: Return to clinic in 6 months to 1 year    Orders Placed This Encounter   Procedures     Hemoglobin A1c     Albumin Random Urine Quantitative with Creat Ratio     Creatinine     Lipid Profile     TSH     Urea nitrogen     Potassium     Electrolyte panel     CBC with platelets       Note: Chart documentation done in part with Dragon Voice Recognition software. Although reviewed after completion, some word and grammatical errors may remain.  Please consider this when interpreting information in this chart    CHARLES Payne    Video-Visit Details    Type of service:  Video Visit    Video visit start time 10:57 AM, video visit end time 11:13 AM    Originating Location (pt. Location): Home    Distant Location (provider location):  [facility] ENDOCRINOLOGY CLINIC [place]     Platform used for Video Visit: AmWell     Time: I spent 30 minutes on the date of the encounter preparing to see patient (including chart review and preparation), obtaining and or reviewing additional medical history, performing an evaluation, documenting clinical information in the electronic health record, independently interpreting results, communicating results to the patient, and/or coordinating care.      Answers for HPI/ROS  submitted by the patient on 1/10/2022  General Symptoms: No  Skin Symptoms: No  HENT Symptoms: No  EYE SYMPTOMS: No  HEART SYMPTOMS: No  LUNG SYMPTOMS: No  INTESTINAL SYMPTOMS: No  URINARY SYMPTOMS: No  REPRODUCTIVE SYMPTOMS: No  SKELETAL SYMPTOMS: No  BLOOD SYMPTOMS: No  NERVOUS SYSTEM SYMPTOMS: No  MENTAL HEALTH SYMPTOMS: No          Bull is a 51 year old who is being evaluated via a billable video visit.      How would you like to obtain your AVS? MyChart  If the video visit is dropped, the invitation should be resent by: Send to e-mail at: [email or web address]  Will anyone else be joining your video visit? No          Again, thank you for allowing me to participate in the care of your patient.      Sincerely,    Boyd Adler MD

## 2024-06-01 DIAGNOSIS — E10.65 TYPE 1 DIABETES MELLITUS WITH HYPERGLYCEMIA (H): ICD-10-CM

## 2024-06-02 NOTE — TELEPHONE ENCOUNTER
"Insulin Infusion Pump Supplies (EXTENDED INFUSION SET 23\"/6MM) MISC   Disp-10 each, R-1   Start: 01/08/2024 4/16/2024  [facility] Endocrinology Clinic [place]    Boyd Adler MD  Endocrinology, Diabetes, and Metabolism    Routed because: endo triage to filll              "

## 2024-06-03 RX ORDER — INFUSION SET FOR INSULIN PUMP
1 INFUSION SETS-PARAPHERNALIA MISCELLANEOUS SEE ADMIN INSTRUCTIONS
Qty: 12 EACH | Refills: 4 | Status: SHIPPED | OUTPATIENT
Start: 2024-06-03

## 2024-06-22 ENCOUNTER — HEALTH MAINTENANCE LETTER (OUTPATIENT)
Age: 54
End: 2024-06-22

## 2024-07-14 DIAGNOSIS — E10.65 TYPE 1 DIABETES MELLITUS WITH HYPERGLYCEMIA (H): ICD-10-CM

## 2024-07-16 RX ORDER — INSULIN PUMP SYRINGE, 3 ML
1 EACH MISCELLANEOUS SEE ADMIN INSTRUCTIONS
Qty: 12 EACH | Refills: 4 | Status: SHIPPED | OUTPATIENT
Start: 2024-07-16

## 2024-10-11 DIAGNOSIS — E10.65 TYPE 1 DIABETES MELLITUS WITH HYPERGLYCEMIA (H): ICD-10-CM

## 2024-10-16 NOTE — TELEPHONE ENCOUNTER
LVD:  4/16/2024  [facility] Endocrinology Clinic [place]     Boyd Adler MD  Endocrinology, Diabetes, and Metabolism     Refilled per protocol.

## 2024-10-25 DIAGNOSIS — E10.65 TYPE 1 DIABETES MELLITUS WITH HYPERGLYCEMIA (H): ICD-10-CM

## 2024-10-29 RX ORDER — BLOOD-GLUCOSE SENSOR
EACH MISCELLANEOUS
Qty: 15 EACH | Refills: 3 | Status: SHIPPED | OUTPATIENT
Start: 2024-10-29

## 2024-11-09 ENCOUNTER — HEALTH MAINTENANCE LETTER (OUTPATIENT)
Age: 54
End: 2024-11-09

## 2025-01-02 ENCOUNTER — MEDICAL CORRESPONDENCE (OUTPATIENT)
Dept: HEALTH INFORMATION MANAGEMENT | Facility: CLINIC | Age: 55
End: 2025-01-02
Payer: COMMERCIAL

## 2025-02-15 DIAGNOSIS — E10.65 TYPE 1 DIABETES MELLITUS WITH HYPERGLYCEMIA (H): ICD-10-CM

## 2025-02-16 NOTE — TELEPHONE ENCOUNTER
insulin lispro (HUMALOG VIAL) 100 UNIT/ML vial 60 mL 5 4/16/2024 -- No   Sig: Via insulin pump. Approx 60 units daily.     Boyd Adler MD  Endocrinology, Diabetes, and Metabolism  Lv  4/16/24 csc  Nv   8/19/25      Routed because: endo triage to fill  Insulin Protocol [id number]/15/2025 10:09 PM   Protocol Details HgbA1C in past 3 or 6 months    Recent (6 mo) or future (90 days) visit within the authorizing provider's specialty    Chart review required

## 2025-02-17 RX ORDER — INSULIN LISPRO 100 [IU]/ML
INJECTION, SOLUTION INTRAVENOUS; SUBCUTANEOUS
Qty: 200 ML | Refills: 1 | Status: SHIPPED | OUTPATIENT
Start: 2025-02-17

## 2025-05-02 DIAGNOSIS — E03.9 HYPOTHYROIDISM, UNSPECIFIED TYPE: ICD-10-CM

## 2025-05-02 DIAGNOSIS — E78.5 DYSLIPIDEMIA: ICD-10-CM

## 2025-05-02 DIAGNOSIS — E10.65 TYPE 1 DIABETES MELLITUS WITH HYPERGLYCEMIA (H): ICD-10-CM

## 2025-05-05 RX ORDER — LEVOTHYROXINE SODIUM 112 UG/1
112 TABLET ORAL DAILY
Qty: 90 TABLET | Refills: 1 | Status: SHIPPED | OUTPATIENT
Start: 2025-05-05

## 2025-05-05 NOTE — TELEPHONE ENCOUNTER
levothyroxine 112 mcg tablet   Last Written Prescription:  4/16/24  ----------------------  Last Visit Date: 4/16/24  Future Visit Date: 8/19/25  ----------------  Refill decision: Medication unable to be refilled by RN due to: Overdue labs/test:    Request from pharmacy:  Requested Prescriptions   Pending Prescriptions Disp Refills    levothyroxine (SYNTHROID/LEVOTHROID) 112 MCG tablet [Pharmacy Med Name: levothyroxine 112 mcg tablet (SYNTHROID)] 90 tablet 3     Sig: Take 1 tablet (112 mcg) by mouth daily       Thyroid Protocol Failed - 5/5/2025  7:00 AM        Failed - Recent (12 mo) or future (90 days) visit within the authorizing provider's specialty     The patient must have completed an in-person or virtual visit within the past 12 months or has a future visit scheduled within the next 90 days with the authorizing provider s specialty.  Urgent care and e-visits do not qualify as an office visit for this protocol.          Failed - Normal TSH on file in past 12 months     Recent Labs   Lab Test 04/16/24  1702   TSH 1.58              Passed - Patient is 12 years or older                     Passed - Medication indicated for associated diagnosis     Medication is associated with one or more of the following diagnoses:  Hypothyroidism  Thyroid stimulating hormone suppression therapy  Thyroid cancer  Acquired atrophy of thyroid

## 2025-05-10 ENCOUNTER — HEALTH MAINTENANCE LETTER (OUTPATIENT)
Age: 55
End: 2025-05-10

## 2025-05-28 ENCOUNTER — TELEPHONE (OUTPATIENT)
Dept: ENDOCRINOLOGY | Facility: CLINIC | Age: 55
End: 2025-05-28
Payer: COMMERCIAL

## 2025-05-28 NOTE — TELEPHONE ENCOUNTER
Left Voicemail (1st Attempt) for the patient to call back and schedule the following:    Appointment type: Return Diabetes   Provider: Nayely  Return date: re-calista 8/19 to virtual hold slots on 8/15 or next avail visit   Specialty phone number: [phone]  Additional appointment(s) needed:   Additonal Notes: LVM, MyC x1   Due to changes in the providers calista appt on 8/19 needs to get re-calista to the virtual hold slots on 8/15 or the next available visit.    Meeta Echevarria on 5/28/2025 at 8:47 AM

## 2025-06-03 ENCOUNTER — TELEPHONE (OUTPATIENT)
Dept: ENDOCRINOLOGY | Facility: CLINIC | Age: 55
End: 2025-06-03
Payer: COMMERCIAL

## 2025-06-03 DIAGNOSIS — E03.9 HYPOTHYROIDISM, UNSPECIFIED TYPE: ICD-10-CM

## 2025-06-03 DIAGNOSIS — E78.5 DYSLIPIDEMIA: ICD-10-CM

## 2025-06-03 DIAGNOSIS — E10.65 TYPE 1 DIABETES MELLITUS WITH HYPERGLYCEMIA (H): Primary | ICD-10-CM

## 2025-06-03 NOTE — TELEPHONE ENCOUNTER
[facility] Call Center    Phone Message    May a detailed message be left on voicemail: yes     Reason for Call: Lab orders needed for the upcoming appt in August. Please call patient when orders have been placed.      Action Taken: Message routed to:  [facility] ENDO    Travel Screening: Not Applicable     Date of Service:

## 2025-06-03 NOTE — TELEPHONE ENCOUNTER
[facility] Call Center    Phone Message    May a detailed message be left on voicemail: yes     Reason for Call: Patient wife would like to talk to Ninfa about the appointment in August. Please call her to discuss further. Thank you.    Action Taken: Message routed to:  [facility] ENDO    Travel Screening: Not Applicable     Date of Service:

## 2025-06-04 NOTE — TELEPHONE ENCOUNTER
No CTC on file. LVMx1 with pt asking to call back/send MyC with any questions they might have and to please relay details to [facility].    Ninfa Oviedo on 6/4/2025 at 2:29 PM

## 2025-07-12 ENCOUNTER — HEALTH MAINTENANCE LETTER (OUTPATIENT)
Age: 55
End: 2025-07-12

## 2025-08-02 DIAGNOSIS — E03.9 HYPOTHYROIDISM, UNSPECIFIED TYPE: ICD-10-CM

## 2025-08-02 DIAGNOSIS — E10.65 TYPE 1 DIABETES MELLITUS WITH HYPERGLYCEMIA (H): ICD-10-CM

## 2025-08-02 DIAGNOSIS — E78.5 DYSLIPIDEMIA: ICD-10-CM

## 2025-08-05 RX ORDER — LEVOTHYROXINE SODIUM 112 UG/1
112 TABLET ORAL DAILY
Qty: 90 TABLET | Refills: 0 | Status: SHIPPED | OUTPATIENT
Start: 2025-08-05

## 2025-08-20 DIAGNOSIS — E10.65 TYPE 1 DIABETES MELLITUS WITH HYPERGLYCEMIA (H): Primary | ICD-10-CM

## 2025-08-20 RX ORDER — PROCHLORPERAZINE 25 MG/1
SUPPOSITORY RECTAL
Qty: 3 EACH | Refills: 5 | Status: SHIPPED | OUTPATIENT
Start: 2025-08-20

## 2025-08-20 RX ORDER — PROCHLORPERAZINE 25 MG/1
SUPPOSITORY RECTAL
Qty: 1 EACH | Refills: 1 | Status: CANCELLED | OUTPATIENT
Start: 2025-08-20

## 2025-08-20 RX ORDER — PROCHLORPERAZINE 25 MG/1
SUPPOSITORY RECTAL
Qty: 3 EACH | Refills: 5 | Status: CANCELLED | OUTPATIENT
Start: 2025-08-20

## 2025-08-20 RX ORDER — PROCHLORPERAZINE 25 MG/1
SUPPOSITORY RECTAL
Qty: 1 EACH | Refills: 1 | Status: SHIPPED | OUTPATIENT
Start: 2025-08-20